# Patient Record
Sex: FEMALE | Race: OTHER | Employment: OTHER | URBAN - METROPOLITAN AREA
[De-identification: names, ages, dates, MRNs, and addresses within clinical notes are randomized per-mention and may not be internally consistent; named-entity substitution may affect disease eponyms.]

---

## 2017-04-25 ENCOUNTER — HOSPITAL ENCOUNTER (EMERGENCY)
Age: 77
Discharge: HOME | End: 2017-04-25
Attending: EMERGENCY MEDICINE
Payer: MEDICARE

## 2017-04-25 VITALS
DIASTOLIC BLOOD PRESSURE: 55 MMHG | OXYGEN SATURATION: 99 % | HEIGHT: 64 IN | HEART RATE: 65 BPM | WEIGHT: 130 LBS | SYSTOLIC BLOOD PRESSURE: 142 MMHG | BODY MASS INDEX: 22.2 KG/M2 | RESPIRATION RATE: 16 BRPM | TEMPERATURE: 98.6 F

## 2017-04-25 DIAGNOSIS — H61.23 BILATERAL IMPACTED CERUMEN: Primary | ICD-10-CM

## 2017-04-25 PROCEDURE — 99283 EMERGENCY DEPT VISIT LOW MDM: CPT | Performed by: PHYSICIAN ASSISTANT

## 2017-04-25 PROCEDURE — 74011250637 HC RX REV CODE- 250/637: Performed by: PHYSICIAN ASSISTANT

## 2017-04-25 RX ADMIN — CARBAMIDE PEROXIDE 6.5% 5 DROP: 6.5 LIQUID AURICULAR (OTIC) at 19:01

## 2017-04-25 NOTE — Clinical Note
Use drops to ears daily, warm water from shower to ears daily, may also use over the counter ear wax removal kit, see your primary md for recheck

## 2017-04-25 NOTE — ED PROVIDER NOTES
Patient is a 64 y.o. female presenting with ear pain. The history is provided by the patient. Ear Pain    This is a new problem. The current episode started more than 2 days ago. The problem occurs constantly. The problem has not changed since onset. Patient complains that both ears are affected. There has been no fever. The pain is at a severity of 4/10. The pain is mild. Associated symptoms include hearing loss. Past Medical History:   Diagnosis Date    Anxiety     Depression     Diabetes mellitus type 2, uncontrolled (HCC)     History of bilateral cataract extraction     History of coronary artery disease     History of gallbladder disease     Hypercholesterolemia     Hyperlipidemia     Hypertension     Osteopenia     Wears dentures        Past Surgical History:   Procedure Laterality Date    HX CATARACT REMOVAL Bilateral     HX CHOLECYSTECTOMY           Family History:   Problem Relation Age of Onset    No Known Problems Mother     No Known Problems Father        Social History     Social History    Marital status: SINGLE     Spouse name: N/A    Number of children: N/A    Years of education: N/A     Occupational History    Not on file. Social History Main Topics    Smoking status: Former Smoker     Packs/day: 0.25     Years: 15.00     Quit date: 6/28/1996    Smokeless tobacco: Never Used    Alcohol use Yes      Comment: social    Drug use: No    Sexual activity: Not on file     Other Topics Concern    Not on file     Social History Narrative         ALLERGIES: Review of patient's allergies indicates no known allergies. Review of Systems   HENT: Positive for ear pain and hearing loss. All other systems reviewed and are negative. Vitals:    04/25/17 1744   BP: 147/53   Pulse: 70   Resp: 16   Temp: 98.6 °F (37 °C)   SpO2: 98%   Weight: 59 kg (130 lb)   Height: 5' 4\" (1.626 m)            Physical Exam   Constitutional: She is oriented to person, place, and time.  She appears well-developed and well-nourished. No distress. HENT:   Head: Normocephalic and atraumatic. Cerumen impaction to both ears, throat clear no pain to mastoid processes     Eyes: Conjunctivae and EOM are normal. Pupils are equal, round, and reactive to light. Neck: Normal range of motion. Neck supple. Cardiovascular: Normal rate and regular rhythm. Pulmonary/Chest: Effort normal and breath sounds normal. No respiratory distress. She has no wheezes. Abdominal: Soft. Bowel sounds are normal.   Musculoskeletal: She exhibits no edema. Neurological: She is alert and oriented to person, place, and time. Skin: Skin is warm. Nursing note and vitals reviewed.        MDM  Number of Diagnoses or Management Options  Diagnosis management comments: Bilateral cerumen impactions  Stressed not to use Q Tips to ears  Debrox placed  To ears in er, use at home flushing, see pmd for recheck        Amount and/or Complexity of Data Reviewed  Review and summarize past medical records: yes    Risk of Complications, Morbidity, and/or Mortality  Presenting problems: moderate  Diagnostic procedures: low  Management options: low    Patient Progress  Patient progress: improved    ED Course       Procedures

## 2017-04-25 NOTE — ED NOTES
I have reviewed medications, follow up provider options, and discharge instructions with the patient and daughter. The patient and daughter verbalized understanding. Copy of discharge information given to daughter upon discharge. Patient discharged in no distress. Patient ambulatory to waiting area.  No questions at this time

## 2017-04-25 NOTE — DISCHARGE INSTRUCTIONS
Bloqueo de cerumen: Instrucciones de cuidado - [ Earwax Blockage: Care Instructions ]  Instrucciones de cuidado    El cerumen es zaida sustancia natural que protege el canal [place]. En general, el cerumen drena de los oídos y no causa problemas. A veces, se acumula y se endurece. El bloqueo por cerumen (también llamada impactación del cerumen) puede causar algo de pérdida de la audición y dolor. Cuando el cerumen está muy compactado necesitará que un médico lo extraiga. La atención de seguimiento es zaida parte clave de tong tratamiento y seguridad. Asegúrese de hacer y acudir a todas las citas, y llame a tong médico si está teniendo problemas. También es zaida buena idea saber los resultados de los exámenes y mantener zaida lista de los medicamentos que patti. ¿Cómo puede cuidarse en el hogar? · No intente extraer el cerumen con hisopos de algodón, dedos u otros objetos. [place] puede empeorar la obstrucción y dañar el tímpano. · Si tong médico le recomienda que trate de extraerse el cerumen en casa:  ¨ [place] y afloje el cerumen con aceite mineral tibio. También puede tratar de usar peróxido de hidrógeno mezclado con zaida cantidad igual de agua a temperatura ambiente. Ponga en el oído 2 gotas del líquido calentado a la temperatura del cuerpo, dos veces al día por un erwin de 5 días. ¨ Zaida vez que la cera está suelta y [place], por lo general todo lo que se necesita para sacarla del canal auditivo es zaida [place] y tibia. Dirija el agua hacia el oído y luego incline tong serjio para permitir que drene el cerumen. Seque el oído completamente con un secador de pelo a baja temperatura. Sostenga la secadora a varias pulgadas del oído. ¨ Si el aceite mineral tibio y la ducha no funcionan, utilice un ablandador de cera de venta jayro seguido por un lavado suave con [place] de oído todas las noches willard zaida o [place].  Asegúrese de que la solución de lavado esté a la temperatura corporal. Los líquidos fríos o calientes en el oído pueden ocasionar mareos. ¿Cuándo debe pedir ayuda? Llame a tong médico ahora mismo o busque atención médica inmediata si:  · Le sale pus o luis f del oído. · Tiene un zumbido en el oído o lo siente lleno. · Tiene pérdida de la audición. Preste especial atención a los cambios en tong tiffany y asegúrese de comunicarse con tong médico si:  · Tiene dolor o se le ha reducido la audición después de 1 semana de tratamiento casero. · Tiene algún síntoma nuevo, arley náuseas o problemas de equilibrio. ¿Dónde puede encontrar más información en inglés? Felix Doherty a http://anil-arnol.info/. Iver Kait [id number] en la búsqueda para aprender más acerca de \"Bloqueo de cerumen: Instrucciones de cuidado - [ Earwax Blockage: Care Instructions ]. \"  Revisado: 27 Toney, 2016  Versión del contenido: 11.2  © 6676-1172 Healthwise, Incorporated. Las instrucciones de cuidado fueron adaptadas bajo licencia por [employer] (which disclaims liability or warranty for this information). Si usted tiene [place] afección médica o sobre estas instrucciones, siempre pregunte a tong profesional de tiffany. cFares, "[employer]" niega toda garantía o responsabilidad por tong uso de esta información.

## 2017-04-26 ENCOUNTER — PATIENT OUTREACH (OUTPATIENT)
Dept: CASE MANAGEMENT | Age: 77
End: 2017-04-26

## 2017-04-26 NOTE — PROGRESS NOTES
This note will not be viewable in [address] [facility]REACH [facility]2  Left message to call back. 2nd outreach attempt unsuccessful. Will schedule 3rd outreach attempt within 5 business days.

## 2017-04-26 NOTE — PROGRESS NOTES
This note will not be viewable in [address] DRAKE OUTREACH #1   Left message for call back. Initial outreach attempt unsuccessful. Will attempt 2nd outreach within 24 hrs.

## 2017-05-01 ENCOUNTER — PATIENT OUTREACH (OUTPATIENT)
Dept: CASE MANAGEMENT | Age: 77
End: 2017-05-01

## 2017-07-22 ENCOUNTER — APPOINTMENT (OUTPATIENT)
Dept: GENERAL RADIOLOGY | Age: 77
End: 2017-07-22
Attending: EMERGENCY MEDICINE
Payer: MEDICARE

## 2017-07-22 ENCOUNTER — HOSPITAL ENCOUNTER (EMERGENCY)
Age: 77
Discharge: HOME | End: 2017-07-22
Attending: EMERGENCY MEDICINE
Payer: MEDICARE

## 2017-07-22 VITALS
TEMPERATURE: 98 F | RESPIRATION RATE: 16 BRPM | DIASTOLIC BLOOD PRESSURE: 66 MMHG | WEIGHT: 127 LBS | HEART RATE: 71 BPM | HEIGHT: 64 IN | BODY MASS INDEX: 21.68 KG/M2 | OXYGEN SATURATION: 96 % | SYSTOLIC BLOOD PRESSURE: 121 MMHG

## 2017-07-22 DIAGNOSIS — R07.89 CHEST WALL PAIN: Primary | ICD-10-CM

## 2017-07-22 PROCEDURE — 99283 EMERGENCY DEPT VISIT LOW MDM: CPT | Performed by: EMERGENCY MEDICINE

## 2017-07-22 PROCEDURE — 71101 X-RAY EXAM UNILAT RIBS/CHEST: CPT

## 2017-07-22 PROCEDURE — 74011250637 HC RX REV CODE- 250/637: Performed by: EMERGENCY MEDICINE

## 2017-07-22 RX ORDER — HYDROCODONE BITARTRATE AND ACETAMINOPHEN 5; 325 MG/1; MG/1
1 TABLET ORAL
Qty: 12 TAB | Refills: 0 | Status: SHIPPED | OUTPATIENT
Start: 2017-07-22 | End: 2017-10-19 | Stop reason: ALTCHOICE

## 2017-07-22 RX ORDER — HYDROCODONE BITARTRATE AND ACETAMINOPHEN 5; 325 MG/1; MG/1
1 TABLET ORAL ONCE
Status: COMPLETED | OUTPATIENT
Start: 2017-07-22 | End: 2017-07-22

## 2017-07-22 RX ADMIN — HYDROCODONE BITARTRATE AND ACETAMINOPHEN 1 TABLET: 5; 325 TABLET ORAL at 22:58

## 2017-07-23 NOTE — DISCHARGE INSTRUCTIONS
Dolor de pecho musculoesquelético: Instrucciones de cuidado - [ Musculoskeletal Chest Pain: Care Instructions ]  Instrucciones de cuidado  El dolor de pecho no siempre es zaida señal de que haya algo reji con tong corazón, o de que tenga algún otro problema grave de tiffany. Tong médico piensa que tong dolor de pecho es causado por músculos o ligamentos forzados, inflamación del cartílago del pecho, o algún otro problema en el pecho y no en el corazón. Es posible que necesite más pruebas para determinar la causa del dolor de [place]. La atención de seguimiento es zaida parte clave de tong tratamiento y seguridad. Asegúrese de hacer y acudir a todas las citas, y llame a tong médico si está teniendo problemas. También es zaida buena idea saber los resultados de los exámenes y mantener zaida lista de los medicamentos que patti. ¿Cómo puede cuidarse en el hogar? · [employer] analgésicos (medicamentos para el dolor) exactamente arley le fueron indicados. ¨ Si el médico le recetó un analgésico, tómelo según las indicaciones. ¨ Si no está tomando un analgésico recetado, pregúntele a tong médico si puede dilan amanda de [employer]. · Descanse y proteja la cecy adolorida. · Interrumpa, modifique o suspenda cualquier actividad que pudiera estar causándole el dolor. · Colóquese hielo o zaida compresa fría en la cecy adolorida willard 10 a 20 minutos cada vez. Trate de hacerlo cada 1 a 2 horas willard los siguientes 3 días (cuando esté despierto) o hasta que la hinchazón baje. Póngase un paño jerez entre el hielo y la piel. · Después de 2 ó 3 días, aplíquese zaida toalla tibia o zaida almohadilla térmica a baja temperatura en la cecy adolorida. Algunos médicos sugieren que se alterne entre tratamientos con calor y frío. · No se envuelva ni se vende con cinta las costillas para sostenerlas. [place] podría hacer que usted alli respiraciones más cortas, lo que podría aumentar tong riesgo de [place].   · [address], arley [place] o [employer], podrían aliviar los músculos adoloridos. Siga las instrucciones del envase. · Siga las instrucciones de tong médico sobre el ejercicio. · El estiramiento y el masaje suaves podrían ayudarle a mejorarse más rápidamente. Estírese despacio hasta el punto antes de que comience el dolor y mantenga el estiramiento willard al menos 15 a 30 segundos. Therese esto 3 ó 4 veces al día. Therese estiramientos después de haberse aplicado calor. · A medida que tong dolor mejore, vuelva poco a poco a mau actividades normales. Si el dolor Te[place], podría ser zaida señal de que necesita descansar willard [place]. ¿Cuándo debe pedir ayuda? Llame al 911 en cualquier momento que considere que necesita atención de emergencia. Por ejemplo, llame si:  · Siente dolor u opresión en el pecho. Estos síntomas podrían estar acompañados de:  ¨ Sudoración. ¨ Falta de aire. ¨ Náuseas o vómito. ¨ Dolor que se extiende del pecho al theo, la Christi, o hacia amanda o ambos hombros o [id number]. ¨ Mareo o aturdimiento. ¨ Pulso rápido o irregular. Después de llamar al 911, mastique 1 aspirina para adultos. Espere zaida ambulancia. No trate de conducir usted mismo un automóvil. · Tiene dolor repentino en el pecho y falta de [place], o tose luis f. Llame a tong médico ahora mismo o busque atención médica inmediata si:  · Tiene cualquier dificultad para respirar. · El dolor en el pecho empeora. · Tong dolor de pecho aparece constantemente con el ejercicio y se lalit con el reposo. Preste especial atención a los cambios en tong tiffany y asegúrese de comunicarse con tong médico si:  · Tong dolor de pecho no mejora después de 1 semana. ¿Dónde puede encontrar más información en inglés? Jluis Green a http://anil-arnol.info/. Ellen Cardona [id number] en la búsqueda para aprender más acerca de \"Dolor de pecho musculoesquelético: Instrucciones de cuidado - [ Musculoskeletal Chest Pain: Care Instructions ]. \"  Revisado: 20 marzo, 2017  Versión del contenido: 11.3  © 8943-5819 Healthwise, [employer]. Las instrucciones de cuidado fueron adaptadas bajo licencia por [employer] (which disclaims liability or warranty for this information). Si usted tiene [place] afección médica o sobre estas instrucciones, siempre pregunte a tong profesional de tiffany. [employer], [employer] niega toda garantía o responsabilidad por tong uso de esta información.

## 2017-07-23 NOTE — ED NOTES
I have reviewed medications, follow up provider options, and discharge instructions with the patient. The patient verbalized understanding. Copy of discharge information given to patient upon discharge. Prescription(s) given to patient. Patient discharged in no distress. Patient instructed not to drive while under influence of narcotic pain medication. Patient ambulatory to waiting area. No questions at this time. Family refused  as daughter of patient was present and speaks [place].

## 2017-07-23 NOTE — ED TRIAGE NOTES
Pt arrives to ED for c/o fall last Monday. States was grocery shopping and feel onto grocery cart handle with left ribs. States rib pain and trouble taking deep breathes.

## 2017-07-23 NOTE — ED PROVIDER NOTES
HPI Comments: Reports falling in shopping parking lot 6 days ago. Pain in left chest everyday since fall, unchanged. Felt should have resolved by now and wanted to have checked. Worse with deep breaths and touch in that area (left anterolateral chest wall). Family member thought skin of chest wall was swollen more than normal after fall, although improved. No bruising. The history is provided by the patient and a relative. A  was used. Past Medical History:   Diagnosis Date    Anxiety     Depression     Diabetes mellitus type 2, uncontrolled (HCC)     History of bilateral cataract extraction     History of coronary artery disease     History of gallbladder disease     Hypercholesterolemia     Hyperlipidemia     Hypertension     Osteopenia     Wears dentures        Past Surgical History:   Procedure Laterality Date    HX CATARACT REMOVAL Bilateral     HX CHOLECYSTECTOMY           Family History:   Problem Relation Age of Onset    No Known Problems Mother     No Known Problems Father        Social History     Social History    Marital status: SINGLE     Spouse name: N/A    Number of children: N/A    Years of education: N/A     Occupational History    Not on file. Social History Main Topics    Smoking status: Former Smoker     Packs/day: 0.25     Years: 15.00     Quit date: 6/28/1996    Smokeless tobacco: Never Used    Alcohol use Yes      Comment: social    Drug use: No    Sexual activity: Not on file     Other Topics Concern    Not on file     Social History Narrative         ALLERGIES: Review of patient's allergies indicates no known allergies. Review of Systems   Constitutional: Negative. HENT: Negative. Respiratory: Negative. Cardiovascular: Negative. Gastrointestinal: Negative. Endocrine: Negative. Genitourinary: Negative. Musculoskeletal: Negative. Skin: Negative. Neurological: Negative. Hematological: Negative. Vitals:    07/22/17 2116   BP: 134/65   Pulse: 72   Resp: 20   Temp: 98 °F (36.7 °C)   SpO2: 95%   Weight: 57.6 kg (127 lb)   Height: 5' 4\" (1.626 m)            Physical Exam   Constitutional: She is oriented to person, place, and time. She appears well-developed and well-nourished. Non-toxic appearance. She does not have a sickly appearance. She does not appear ill. No distress. HENT:   Head: Normocephalic and atraumatic. Cardiovascular: Intact distal pulses. Pulmonary/Chest: Effort normal.       TTP in area marked. No crepitus. No swelling appreciated. Pain radiates to back side with palpation. No midline tenderness. Not significantly changed with flex/extension of back. Abdominal: Soft. Neurological: She is alert and oriented to person, place, and time. Skin: Skin is warm and dry. Psychiatric: She has a normal mood and affect. Her behavior is normal.   Nursing note and vitals reviewed. MDM  Number of Diagnoses or Management Options  Chest wall pain: new and requires workup  Diagnosis management comments: Patient does not appear to have significant rib fracture on plain film. Lungs appeared inflated. No effusion appreciated. Patient not having a difficulty breathing. Pain is very well localized left lateral anterior side as marked and is unchanged since the day of incident. No abdominal pain appreciated. No splenomegaly, tenderness, guarding or rebound appreciated. Patient denies having any lightheadedness, nausea, vomiting or change in her symptoms since incident. She is tried nonsteroidal anti-inflammatories but has not made her pain go away completely. She understands that there can still be occult injury to the chest wall that is not visualized on plain film and would recommend NSAIDs to continue as well as warm compresses in the area of concern and have reevaluation by her primary care this week.   At no point when I expect her other new symptoms develop at this point and should only continue to improve albeit slowly. If she has any concerns going forward should return to the ER for prompt re-evaluation if not being seen by her primary care immediately. Patient understands prescription medicines for pain can be used, but likely will not resolve the pain, but could provide some relief to allow for sleep if difficult at night. I discussed the results of all labs, procedures, radiographs, and treatments with the patient and available family. Treatment plan is agreed upon and the patient is ready for discharge. Questions about treatment in the ED and differential diagnosis of presenting condition were answered. Patient was given verbal discharge instructions including, but not limited to, importance of returning to the emergency department for any concern of worsening or continued symptoms. Instructions were given to follow up with a primary care provider or specialist within 1-2 days. Adverse effects of medications, if prescribed, were discussed and patient was advised to refrain from significant physical activity until followed up by primary care physician and to not drive or operate heavy machinery after taking any sedating substances.            Amount and/or Complexity of Data Reviewed  Tests in the radiology section of CPT®: ordered and reviewed      ED Course       Procedures

## 2018-01-12 LAB — GLUCOSE BLD STRIP.AUTO-MCNC: 320 MG/DL (ref 65–100)

## 2018-01-12 PROCEDURE — 96360 HYDRATION IV INFUSION INIT: CPT | Performed by: EMERGENCY MEDICINE

## 2018-01-12 PROCEDURE — 82962 GLUCOSE BLOOD TEST: CPT

## 2018-01-12 PROCEDURE — 99283 EMERGENCY DEPT VISIT LOW MDM: CPT | Performed by: EMERGENCY MEDICINE

## 2018-01-13 ENCOUNTER — HOSPITAL ENCOUNTER (EMERGENCY)
Age: 78
Discharge: HOME | End: 2018-01-13
Attending: EMERGENCY MEDICINE
Payer: COMMERCIAL

## 2018-01-13 VITALS
DIASTOLIC BLOOD PRESSURE: 74 MMHG | BODY MASS INDEX: 21.17 KG/M2 | SYSTOLIC BLOOD PRESSURE: 126 MMHG | HEIGHT: 64 IN | HEART RATE: 73 BPM | RESPIRATION RATE: 18 BRPM | TEMPERATURE: 98 F | WEIGHT: 124 LBS | OXYGEN SATURATION: 98 %

## 2018-01-13 DIAGNOSIS — E11.9 TYPE 2 DIABETES MELLITUS WITHOUT COMPLICATION, WITH LONG-TERM CURRENT USE OF INSULIN (HCC): Primary | ICD-10-CM

## 2018-01-13 DIAGNOSIS — E78.00 HYPERCHOLESTEROLEMIA: ICD-10-CM

## 2018-01-13 DIAGNOSIS — Z79.4 TYPE 2 DIABETES MELLITUS WITHOUT COMPLICATION, WITH LONG-TERM CURRENT USE OF INSULIN (HCC): Primary | ICD-10-CM

## 2018-01-13 DIAGNOSIS — I10 ESSENTIAL HYPERTENSION: ICD-10-CM

## 2018-01-13 LAB
ALBUMIN SERPL-MCNC: 3.6 G/DL (ref 3.2–4.6)
ALBUMIN/GLOB SERPL: 0.9 {RATIO} (ref 1.2–3.5)
ALP SERPL-CCNC: 81 U/L (ref 50–136)
ALT SERPL-CCNC: 25 U/L (ref 12–65)
ANION GAP SERPL CALC-SCNC: 8 MMOL/L (ref 7–16)
AST SERPL-CCNC: 15 U/L (ref 15–37)
BASOPHILS # BLD: 0 K/UL (ref 0–0.2)
BASOPHILS NFR BLD: 1 % (ref 0–2)
BILIRUB SERPL-MCNC: 0.2 MG/DL (ref 0.2–1.1)
BUN SERPL-MCNC: 26 MG/DL (ref 8–23)
CALCIUM SERPL-MCNC: 9.1 MG/DL (ref 8.3–10.4)
CHLORIDE SERPL-SCNC: 102 MMOL/L (ref 98–107)
CO2 SERPL-SCNC: 29 MMOL/L (ref 21–32)
CREAT SERPL-MCNC: 0.64 MG/DL (ref 0.6–1)
DIFFERENTIAL METHOD BLD: NORMAL
EOSINOPHIL # BLD: 0.2 K/UL (ref 0–0.8)
EOSINOPHIL NFR BLD: 3 % (ref 0.5–7.8)
ERYTHROCYTE [DISTWIDTH] IN BLOOD BY AUTOMATED COUNT: 12.9 % (ref 11.9–14.6)
GLOBULIN SER CALC-MCNC: 4 G/DL (ref 2.3–3.5)
GLUCOSE SERPL-MCNC: 167 MG/DL (ref 65–100)
HCT VFR BLD AUTO: 39 % (ref 35.8–46.3)
HGB BLD-MCNC: 13.1 G/DL (ref 11.7–15.4)
IMM GRANULOCYTES # BLD: 0 K/UL (ref 0–0.5)
IMM GRANULOCYTES NFR BLD AUTO: 0 % (ref 0–5)
LYMPHOCYTES # BLD: 1.9 K/UL (ref 0.5–4.6)
LYMPHOCYTES NFR BLD: 36 % (ref 13–44)
MCH RBC QN AUTO: 31.5 PG (ref 26.1–32.9)
MCHC RBC AUTO-ENTMCNC: 33.6 G/DL (ref 31.4–35)
MCV RBC AUTO: 93.8 FL (ref 79.6–97.8)
MONOCYTES # BLD: 0.5 K/UL (ref 0.1–1.3)
MONOCYTES NFR BLD: 9 % (ref 4–12)
NEUTS SEG # BLD: 2.7 K/UL (ref 1.7–8.2)
NEUTS SEG NFR BLD: 51 % (ref 43–78)
PLATELET # BLD AUTO: 290 K/UL (ref 150–450)
PMV BLD AUTO: 10.9 FL (ref 10.8–14.1)
POTASSIUM SERPL-SCNC: 3.7 MMOL/L (ref 3.5–5.1)
PROT SERPL-MCNC: 7.6 G/DL (ref 6.3–8.2)
RBC # BLD AUTO: 4.16 M/UL (ref 4.05–5.25)
SODIUM SERPL-SCNC: 139 MMOL/L (ref 136–145)
WBC # BLD AUTO: 5.3 K/UL (ref 4.3–11.1)

## 2018-01-13 PROCEDURE — 96360 HYDRATION IV INFUSION INIT: CPT | Performed by: EMERGENCY MEDICINE

## 2018-01-13 PROCEDURE — 85025 COMPLETE CBC W/AUTO DIFF WBC: CPT | Performed by: EMERGENCY MEDICINE

## 2018-01-13 PROCEDURE — 74011250636 HC RX REV CODE- 250/636: Performed by: EMERGENCY MEDICINE

## 2018-01-13 PROCEDURE — 80053 COMPREHEN METABOLIC PANEL: CPT | Performed by: EMERGENCY MEDICINE

## 2018-01-13 RX ORDER — SODIUM CHLORIDE 0.9 % (FLUSH) 0.9 %
5-10 SYRINGE (ML) INJECTION EVERY 8 HOURS
Status: DISCONTINUED | OUTPATIENT
Start: 2018-01-13 | End: 2018-01-13 | Stop reason: HOSPADM

## 2018-01-13 RX ORDER — SODIUM CHLORIDE 0.9 % (FLUSH) 0.9 %
5-10 SYRINGE (ML) INJECTION AS NEEDED
Status: DISCONTINUED | OUTPATIENT
Start: 2018-01-13 | End: 2018-01-13 | Stop reason: HOSPADM

## 2018-01-13 RX ADMIN — SODIUM CHLORIDE 1000 ML: 900 INJECTION, SOLUTION INTRAVENOUS at 02:03

## 2018-01-13 NOTE — DISCHARGE INSTRUCTIONS
Aprenda acerca de las pautas alimentarias para diabetes - [ Learning About Diabetes Food Guidelines ]  Instrucciones de cuidado    La planificación de las comidas es importante para el manejo de la diabetes. Ayuda a mantener el azúcar en la luis f en el nivel ideal (que usted fija con tong médico). Usted no tiene que comer alimentos especiales. Puede comer lo mismo que tong edilma, incluso dulces de vez en cuando. [place] debe prestar atención a la cantidad y la frecuencia con que come ciertos alimentos. Rolan vez desee colaborar con un dietista o educador de diabetes certificado (CDE, por mau siglas en inglés) para que le ayuden a planificar las comidas y los refrigerios. Un dietista o CDE también puede ayudarle a bajar de peso si janelle es amanda de mau objetivos. ¿Qué debería saber acerca de ingerir carbohidratos? Manejar la cantidad de carbohidratos que ingiere es zaida parte importante de la alimentación saludable cuando tiene diabetes. Los carbohidratos se encuentran en muchos alimentos. · Sepa qué alimentos contienen carbohidratos. Y aprenda qué cantidad de carbohidratos contienen los diferentes alimentos. ¨ El pan, los cereales, la pasta y el arroz tienen aproximadamente 15 gramos de carbohidratos por porción. Zaida porción equivale a 1 rebanada de pan (1 onza o 28 g), ½ taza de cereal cocido o 1/3 de taza de pasta o arroz cocidos. ¨ Las frutas tienen 15 gramos de carbohidratos por porción. [place] porción es 1 fruta fresca pequeña, arley zaida Corpus brandon o zaida naranja; ½ banana (plátano); ½ taza de fruta cocida o enlatada; ½ taza de jugo de fruta; 1 taza de melón o frambuesas; o 2 cucharadas de frutas secas. ¨ La leche y el yogur sin azúcar agregado tienen 15 gramos de carbohidratos por porción. [place] porción es 1 taza de [place] o 2/3 taza de yogur sin azúcar agregado. ¨ Las verduras con almidón tienen 15 gramos de carbohidratos por porción.  [place] porción es ½ taza de puré de papa o camote (batata, boniato); 1 taza de calabacín; ½ papa horneada pequeña; ½ taza de frijoles cocidos; o ½ taza de maíz (elote) o arvejas (chícharos) cocidos. · Aprenda cuántos carbohidratos debe consumir cada día y en cada comida. Un dietista o CDE le puede enseñar cómo llevar la cuenta de los carbohidratos que consume. A esto se le llama recuento de carbohidratos. · Si no está seguro de cómo [place] gramos de carbohidratos, utilice el Método del [place] para planificar las comidas. Es zaida Mitesh Keira y rápida de asegurarse de que consuma comidas equilibradas. También le ayuda a distribuir los carbohidratos willard el día. ¨ Divida el plato por tipo de alimento. Llene medio plato con verduras sin almidón, ponga carne u otras proteínas en zaida cuarta parte del plato y granos o verduras con almidón en el último cuarto del plato. A esto puede agregarle un pequeño pedazo de fruta y 3 taza de [place] o yogur, según la cantidad de carbohidratos que deba consumir en zaida comida. · Trate de comer aproximadamente la misma cantidad de carbohidratos en cada comida. No \"reserve\" tong cantidad diaria de carbohidratos para consumirlos en zaida luciano comida. · Las proteínas contienen muy pocos o nada de carbohidratos por porción. Los ejemplos de proteínas incluyen carne de res, Dandre heights, [place], [place], [place], tofu, Jarrod-barre, requesón (\"cottage cheese\") y la mantequilla de cacahuate [place]). Zaida porción de carne son 3 onzas (85 g), lo cual es aproximadamente del tamaño de zaida baraja de naipes. Los ejemplos de porciones de sustitutos de la carne (equivalente a 1 onza o 28 g de carne) son 1/4 de taza de requesón, 1 huevo, 1 cucharada de [place] de cacahuate y ½ taza de tofu. ¿Cómo puede comer fuera y aún así comer de modo saludable? · Aprenda a calcular los tamaños de las porciones de alimentos que contienen carbohidratos. Si mide la comida en casa, será más fácil calcular la cantidad en zaida porción de comida de restaurante.   · Si el platillo que pide contiene demasiados carbohidratos (arley brea, maíz o frijoles al horno), pida un alimento bajo en carbohidratos en juarez lugar. Pida zaida ensalada o verduras. · Si [place] insulina, revise juarez azúcar en la luis f antes y después de comer fuera para ayudarle a planear cuánto comer en el futuro. · Si usted come más carbohidratos de lo planeado en zaida comida, dé un paseo o alli otro tipo de ejercicio. [place] ayudará a reducir el azúcar en la luis f. ¿Qué más debería saber? · Limite las grasas saturadas, arley la grasa de la carne y productos lácteos. Esta es zaida opción saludable, porque las personas que tienen diabetes tienen un mayor riesgo de enfermedades del corazón. Así que elija romero magros de carne y productos lácteos descremados o semidescremados. Utilice aceite de lawson o de canola en lugar de mantequilla o manteca al cocinar. · No se salte comidas. [place] nivel de azúcar en la luis f puede bajar demasiado si usted se salta comidas y patti insulina o ciertos medicamentos para la diabetes. · Consulte con juarez médico antes de beber alcohol. El alcohol puede hacer que juarez azúcar en la luis f baje demasiado. El alcohol también puede causar zaida reacción adversa si usted patti ciertos medicamentos para la diabetes. La atención de seguimiento es zaida parte clave de juarez tratamiento y seguridad. Asegúrese de hacer y acudir a todas las citas, y llame a juarez médico si está teniendo problemas. También es zaida buena idea saber los resultados de los exámenes y mantener zaida lista de los medicamentos que patti. ¿Dónde puede encontrar más información en inglés? Katerine Chopra a http://anil-arnol.info/. Brenda Herbert [id number] en la búsqueda para aprender más acerca de \"Aprenda acerca de las pautas alimentarias para diabetes - [ Learning About Diabetes Food Guidelines ]. \"  Revisado: 13 marzo, 2017  Versión del contenido: 11.4  © 1248-4710 Healthwise, Incorporated.  Las instrucciones de cuidado fueron adaptadas bajo licencia por [employer] (which disclaims liability or warranty for this information). Si usted tiene [place] afección médica o sobre estas instrucciones, siempre pregunte a tong profesional de tiffany. [facility] niega toda garantía o responsabilidad por tong uso de esta información.

## 2018-01-13 NOTE — ED NOTES
I have reviewed medications, follow up provider options, and discharge instructions with the daughter. The daughter verbalized understanding. Copy of discharge information given to daughter upon discharge. Patient discharged in no distress. Patient ambulatory to waiting area. No questions at this time.

## 2018-01-13 NOTE — ED PROVIDER NOTES
HPI Comments: History is obtained from the patient's daughter who speaks [place]. She states that the patient burned her left hand about a week ago and has had pain since then. She got concerned because the patient is diabetic and the area was slightly red. She is worried about a possible infection. She also states that the patient's blood sugar has been running high in the 300s for the last couple months. She does take oral medication and insulin for diabetes, both of which she has been taking. The daughter denies any fever or vomiting. Elements of this note were created using speech recognition software. As such, errors of speech recognition may be present. Patient is a 76 y.o. female presenting with skin problem. The history is provided by the patient. Skin Problem           Past Medical History:   Diagnosis Date    Anxiety     Depression     Diabetes mellitus type 2, uncontrolled (HCC)     History of bilateral cataract extraction     History of coronary artery disease     History of gallbladder disease     Hypercholesterolemia     Hyperlipidemia     Hypertension     Osteopenia     Wears dentures        Past Surgical History:   Procedure Laterality Date    HX CATARACT REMOVAL Bilateral     HX CHOLECYSTECTOMY           Family History:   Problem Relation Age of Onset    No Known Problems Mother     No Known Problems Father        Social History     Social History    Marital status: SINGLE     Spouse name: N/A    Number of children: N/A    Years of education: N/A     Occupational History    Not on file.      Social History Main Topics    Smoking status: Former Smoker     Packs/day: 0.25     Years: 15.00     Quit date: 6/28/1996    Smokeless tobacco: Never Used    Alcohol use Yes      Comment: social    Drug use: No    Sexual activity: Not on file     Other Topics Concern    Not on file     Social History Narrative         ALLERGIES: Review of patient's allergies indicates no known allergies. Review of Systems   Constitutional: Negative for chills and fever. Gastrointestinal: Negative for nausea and vomiting. All other systems reviewed and are negative. Vitals:    01/12/18 2329   BP: 127/79   Pulse: 80   Resp: 18   Temp: 97.9 °F (36.6 °C)   SpO2: 97%   Weight: 56.2 kg (124 lb)   Height: 5' 4\" (1.626 m)            Physical Exam   Constitutional: She appears well-developed and well-nourished. HENT:   Head: Normocephalic and atraumatic. Eyes: Conjunctivae are normal. Pupils are equal, round, and reactive to light. Neck: Normal range of motion. Neck supple. Cardiovascular: Normal rate and regular rhythm. Pulmonary/Chest: Effort normal and breath sounds normal.   Abdominal: Soft. Bowel sounds are normal.   Musculoskeletal: She exhibits no edema or tenderness. Hands:  Left hand healing scabs with minimal surrounding erythema at the second MCP joint laterally as indicated. No cellulitic changes noted   Neurological: She is alert. Skin: Skin is warm and dry. Nursing note and vitals reviewed. MDM  Number of Diagnoses or Management Options  Essential hypertension:   Hypercholesterolemia:   Type 2 diabetes mellitus without complication, with long-term current use of insulin [facility]):   Diagnosis management comments: differential diagnosis: saline as, DKA, medication noncompliance, electrolyte abnormality  1:52 AM fingerstick blood sugar was 320, we will get an IV and check labs  2:52 AM blood sugar has improved, no metabolic acidosis is present. Discussed this with daughter, need for close follow-up with the patient's primary doctor.        Amount and/or Complexity of Data Reviewed  Clinical lab tests: ordered and reviewed    Risk of Complications, Morbidity, and/or Mortality  Presenting problems: moderate  Diagnostic procedures: moderate  Management options: moderate    Patient Progress  Patient progress: improved    ED Course       Procedures

## 2018-01-13 NOTE — ED TRIAGE NOTES
Pt complains of pain to left hand. Daughter states she burned it on stove 1 week ago. She is concerned about continued pain and recent elevated FSBSs.

## 2018-02-06 ENCOUNTER — APPOINTMENT (OUTPATIENT)
Dept: GENERAL RADIOLOGY | Age: 78
End: 2018-02-06
Attending: EMERGENCY MEDICINE
Payer: COMMERCIAL

## 2018-02-06 ENCOUNTER — HOSPITAL ENCOUNTER (EMERGENCY)
Age: 78
Discharge: HOME | End: 2018-02-06
Attending: EMERGENCY MEDICINE
Payer: COMMERCIAL

## 2018-02-06 VITALS
DIASTOLIC BLOOD PRESSURE: 74 MMHG | RESPIRATION RATE: 18 BRPM | WEIGHT: 132 LBS | BODY MASS INDEX: 22.53 KG/M2 | OXYGEN SATURATION: 98 % | TEMPERATURE: 97.6 F | HEIGHT: 64 IN | HEART RATE: 64 BPM | SYSTOLIC BLOOD PRESSURE: 172 MMHG

## 2018-02-06 DIAGNOSIS — S63.502A WRIST SPRAIN, LEFT, INITIAL ENCOUNTER: Primary | ICD-10-CM

## 2018-02-06 PROCEDURE — L3908 WHO COCK-UP NONMOLDE PRE OTS: HCPCS

## 2018-02-06 PROCEDURE — 74011250637 HC RX REV CODE- 250/637: Performed by: EMERGENCY MEDICINE

## 2018-02-06 PROCEDURE — 73090 X-RAY EXAM OF FOREARM: CPT

## 2018-02-06 PROCEDURE — 99283 EMERGENCY DEPT VISIT LOW MDM: CPT | Performed by: EMERGENCY MEDICINE

## 2018-02-06 PROCEDURE — 75810000053 HC SPLINT APPLICATION: Performed by: EMERGENCY MEDICINE

## 2018-02-06 PROCEDURE — 73130 X-RAY EXAM OF HAND: CPT

## 2018-02-06 RX ORDER — IBUPROFEN 600 MG/1
600 TABLET ORAL
Qty: 20 TAB | Refills: 1 | OUTPATIENT
Start: 2018-02-06 | End: 2020-03-10

## 2018-02-06 RX ORDER — HYDROCODONE BITARTRATE AND ACETAMINOPHEN 5; 325 MG/1; MG/1
1 TABLET ORAL
Status: COMPLETED | OUTPATIENT
Start: 2018-02-06 | End: 2018-02-06

## 2018-02-06 RX ADMIN — HYDROCODONE BITARTRATE AND ACETAMINOPHEN 1 TABLET: 5; 325 TABLET ORAL at 19:08

## 2018-02-06 NOTE — DISCHARGE INSTRUCTIONS
Esguince de ernesto: Instrucciones de cuidado - [ Wrist Sprain: Care Instructions ]  Instrucciones de cuidado    La ernesto le duele porque se toledo estirado o desgarrado los ligamentos que conectan los huesos de la [place]. Los esguinces (torceduras) de ernesto generalmente tardan de 2 a 10 semanas en sanar, shaka en algunos casos se necesita más tiempo. Por lo general, mientras más le duele, más grave es el esguince de [place] y [place] tardará en sanar. Usted puede sanar más rápido y recuperar la fuerza de la ernesto con un buen tratamiento en el hogar. La atención de seguimiento es zaida parte clave de tong tratamiento y seguridad. Asegúrese de hacer y acudir a todas las citas, y llame a tong médico si está teniendo problemas. También es zaida buena idea saber los resultados de los exámenes y mantener zaida lista de los medicamentos que patti. ¿Cómo puede cuidarse en el hogar? · Eleve el brazo sobre zaida almohada cuando se aplica hielo o en cualquier momento que se siente o acueste willard los 3 días siguientes. Trate de mantener tong ernesto por encima del nivel del corazón. Horatio ayudará a reducir la hinchazón. · Colóquese hielo o zaida compresa fría en la ernesto willard 10 a 20 minutos cada vez. Trate de hacerlo cada 1 a 2 horas willard los siguientes 3 días (cuando esté despierto) o hasta que la hinchazón baje. Póngase un paño jerez entre el hielo y la piel. · Después de 2 ó 3 días, si la hinchazón ha desaparecido, póngase en la ernesto zaida almohadilla térmica ajustada a baja temperatura o un paño tibio. Horatio le ayudará a mantener flexible la ernesto. Algunos médicos sugieren que se alterne entre tratamientos con calor y frío. · Si tiene un vendaje elástico, llévelo puesto willard las siguientes 24 a 36 horas. El vendaje debe estar ajustado shaka no cassidy apretado que cause entumecimiento u hormigueo. Para volver a envolver la ernesto, enrolle el vendaje en la mano varias veces, empezando por los dedos.  Genoveva Ayala alrededor de la mano entre el [facility] y [place], y termine envolviendo la ernesto varias veces. · Si juarez médico le rah zaida tablilla (férula) o aparato ortopédico, úselo según las indicaciones para proteger juarez ernesto hasta que haya sanado. · [employer] analgésicos (medicamentos para el dolor) exactamente según las indicaciones. ¨ Si el médico le recetó un analgésico, tómelo según las indicaciones. ¨ Si no está tomando un analgésico recetado, pregúntele a juarez médico si puede dilan amanda de [employer]. · Trate de no usar la [employer] y la mano lesionadas. ¿Cuándo debe pedir ayuda? Llame a juarez médico ahora mismo o busque atención médica inmediata si:  ? · Juarez mano o mau dedos están fríos o pálidos o [place] de color. ?Preste especial atención a los cambios en juarez tiffany y asegúrese de comunicarse con juarez médico si:  ? · El dolor [facility]. ? · Juarez ernesto no ha monisha después de 1 semana. ¿Dónde puede encontrar más información en inglés? Vero Quiver a http://anil-arnol.info/. Sabino Sit U438 en la búsqueda para aprender más acerca de \"Esguince de ernesto: Instrucciones de cuidado - [ Wrist Sprain: Care Instructions ]. \"  Revisado: 21 marzo, 2017  Versión del contenido: 11.4  © 9576-2959 Healthwise, Incorporated. Las instrucciones de cuidado fueron adaptadas bajo licencia por [employer] (which disclaims liability or warranty for this information). Si usted tiene [employer] afección médica o sobre estas instrucciones, siempre pregunte a juarez profesional de tiffany. Healthwise, Incorporated niega toda garantía o responsabilidad por juarez uso de esta información.

## 2018-02-06 NOTE — Clinical Note
Elevation and cool packs to area of injury Wear splint If pain persists at present level will need further workup and repeat x-ray of the area of injury in 5-7 days

## 2018-02-06 NOTE — ED TRIAGE NOTES
Pt was going up stairs and fell backwards landing on the left arm. Pt states pain from the elbow down. Pt his her head during the fall.

## 2018-02-06 NOTE — ED PROVIDER NOTES
HPI Comments: Out walking a dog when she had a trip and fall and breaking her fall with an outreach of her left arm. She has pain to the dorsal aspect of the left wrist possibly slight swelling. No distal numbness or weakness. Possibly bumped her head but no loss of consciousness. denies any present head pain or area of localized tenderness or swelling. Denies any neck pain. Denies any back pain. No rib or chest pain. The right wrist and right arm are unaffected    Patient is a 76 y.o. female presenting with fall. The history is provided by the patient and a relative. Fall   The accident occurred 3 to 5 hours ago. The fall occurred while walking. She fell from a height of ground level. Impact surface: hard surface. There was no blood loss. The point of impact was the left wrist. The pain is moderate. She was ambulatory at the scene. There was no drug use involved in the accident. There was no alcohol use involved in the accident. Pertinent negatives include no visual change, no numbness, no nausea, no vomiting, no headaches, no extremity weakness, no loss of consciousness, no tingling and no laceration. The risk factors include being elderly. The symptoms are aggravated by pressure on injury and use of injured limb. She has tried nothing for the symptoms.         Past Medical History:   Diagnosis Date    Anxiety     Depression     Diabetes mellitus type 2, uncontrolled (HCC)     History of bilateral cataract extraction     History of coronary artery disease     History of gallbladder disease     Hypercholesterolemia     Hyperlipidemia     Hypertension     Osteopenia     Wears dentures        Past Surgical History:   Procedure Laterality Date    HX CATARACT REMOVAL Bilateral     HX CHOLECYSTECTOMY           Family History:   Problem Relation Age of Onset    No Known Problems Mother     No Known Problems Father        Social History     Social History    Marital status: SINGLE     Spouse name: N/A    Number of children: N/A    Years of education: N/A     Occupational History    Not on file. Social History Main Topics    Smoking status: Former Smoker     Packs/day: 0.25     Years: 15.00     Quit date: 6/28/1996    Smokeless tobacco: Never Used    Alcohol use Yes      Comment: social    Drug use: No    Sexual activity: Not on file     Other Topics Concern    Not on file     Social History Narrative         ALLERGIES: Review of patient's allergies indicates no known allergies. Review of Systems   HENT: Negative. Respiratory: Negative. Cardiovascular: Negative. Gastrointestinal: Negative for nausea and vomiting. Musculoskeletal: Negative for back pain, extremity weakness, joint swelling, neck pain and neck stiffness. Skin: Negative. Negative for color change and wound. Neurological: Negative for tingling, loss of consciousness, numbness and headaches. All other systems reviewed and are negative. Vitals:    02/06/18 1607   BP: 173/71   Pulse: 60   Resp: 20   Temp: 97.5 °F (36.4 °C)   SpO2: 97%   Weight: 59.9 kg (132 lb)   Height: 5' 4\" (1.626 m)            Physical Exam   Constitutional: She appears well-developed and well-nourished. No distress. HENT:   Head: Atraumatic. Eyes: No scleral icterus. Neck: Neck supple. Cardiovascular: Normal rate. Pulmonary/Chest: Effort normal. No respiratory distress. Abdominal: Soft. Musculoskeletal: She exhibits tenderness. Right shoulder: Normal.        Right elbow: Normal.       Left elbow: Normal.        Right wrist: Normal.        Left wrist: She exhibits tenderness and swelling. She exhibits no crepitus, no deformity and no laceration.         Right hip: Normal.        Left hip: Normal.        Right knee: Normal.        Left knee: Normal.        Right ankle: Normal.        Left ankle: Normal.        Cervical back: Normal.        Thoracic back: Normal.        Lumbar back: Normal.        Left forearm: She exhibits no swelling, no edema and no deformity. Neurological: She is alert. She exhibits normal muscle tone. Coordination normal.   Skin: Skin is warm and dry. No laceration noted. Psychiatric: Her behavior is normal. Thought content normal.   Nursing note and vitals reviewed. MDM  Number of Diagnoses or Management Options  Wrist sprain, left, initial encounter:   Diagnosis management comments: Soreness to the dorsal aspect left wrist with normal radiographic findings. No other area of significant injury to exam.  Radiograph shows no displaced fracture but review of this information with patient and daughter and awareness of importance to recheck/re-x-ray with any persistence of pain at present level. Possibility of occult fracture is discussed in this context.        Amount and/or Complexity of Data Reviewed  Tests in the radiology section of CPT®: reviewed and ordered    Risk of Complications, Morbidity, and/or Mortality  Presenting problems: moderate  Diagnostic procedures: low  Management options: moderate    Patient Progress  Patient progress: stable        ED Course       Procedures

## 2018-02-07 NOTE — ED NOTES
I have reviewed discharge instructions with the patient. The patient verbalized understanding. Patient left ED via Discharge Method: ambulatory to Home with family. Opportunity for questions and clarification provided. Patient given one e-script scripts. To continue your aftercare when you leave the hospital, you may receive an automated call from our care team to check in on how you are doing. This is a free service and part of our promise to provide the best care and service to meet your aftercare needs.  If you have questions, or wish to unsubscribe from this service please call [phone]. Thank you for Choosing our [address] [place] [address] Emergency Department.

## 2018-03-24 ENCOUNTER — APPOINTMENT (OUTPATIENT)
Dept: GENERAL RADIOLOGY | Age: 78
End: 2018-03-24
Attending: EMERGENCY MEDICINE
Payer: COMMERCIAL

## 2018-03-24 ENCOUNTER — HOSPITAL ENCOUNTER (EMERGENCY)
Age: 78
Discharge: HOME | End: 2018-03-24
Attending: EMERGENCY MEDICINE
Payer: COMMERCIAL

## 2018-03-24 VITALS
OXYGEN SATURATION: 97 % | TEMPERATURE: 98.4 F | BODY MASS INDEX: 23.32 KG/M2 | HEIGHT: 65 IN | HEART RATE: 81 BPM | DIASTOLIC BLOOD PRESSURE: 76 MMHG | SYSTOLIC BLOOD PRESSURE: 153 MMHG | RESPIRATION RATE: 16 BRPM | WEIGHT: 140 LBS

## 2018-03-24 DIAGNOSIS — T07.XXXA MULTIPLE CONTUSIONS: Primary | ICD-10-CM

## 2018-03-24 PROCEDURE — 99283 EMERGENCY DEPT VISIT LOW MDM: CPT | Performed by: EMERGENCY MEDICINE

## 2018-03-24 PROCEDURE — 73130 X-RAY EXAM OF HAND: CPT

## 2018-03-24 PROCEDURE — 74011250637 HC RX REV CODE- 250/637: Performed by: EMERGENCY MEDICINE

## 2018-03-24 PROCEDURE — 71046 X-RAY EXAM CHEST 2 VIEWS: CPT

## 2018-03-24 RX ORDER — TRAMADOL HYDROCHLORIDE 50 MG/1
50-100 TABLET ORAL
Qty: 20 TAB | Refills: 0 | Status: SHIPPED | OUTPATIENT
Start: 2018-03-24 | End: 2018-05-30 | Stop reason: ALTCHOICE

## 2018-03-24 RX ORDER — NAPROXEN SODIUM 550 MG/1
550 TABLET ORAL 2 TIMES DAILY WITH MEALS
Qty: 20 TAB | Refills: 0 | Status: SHIPPED | OUTPATIENT
Start: 2018-03-24 | End: 2018-05-30 | Stop reason: ALTCHOICE

## 2018-03-24 RX ORDER — TRAMADOL HYDROCHLORIDE 50 MG/1
100 TABLET ORAL
Status: COMPLETED | OUTPATIENT
Start: 2018-03-24 | End: 2018-03-24

## 2018-03-24 RX ORDER — IBUPROFEN 600 MG/1
600 TABLET ORAL
Status: COMPLETED | OUTPATIENT
Start: 2018-03-24 | End: 2018-03-24

## 2018-03-24 RX ADMIN — TRAMADOL HYDROCHLORIDE 100 MG: 50 TABLET, FILM COATED ORAL at 21:53

## 2018-03-24 RX ADMIN — IBUPROFEN 600 MG: 600 TABLET, FILM COATED ORAL at 21:53

## 2018-03-24 NOTE — ED TRIAGE NOTES
Family declined use of .  Family states that she fell coming out of the supper market pt state that she tripped and she is hurting all over knees, face, back  and while body hurts

## 2018-03-25 NOTE — DISCHARGE INSTRUCTIONS
Moretones: Instrucciones de cuidado - [ Bruises: Care Instructions ]  Instrucciones de cuidado    Los moretones ocurren cuando los pequeños vasos sanguíneos que se encuentran debajo de la piel se rompen o se desgarran, en la mayoría de los casos por torceduras, golpes o caídas. La luis f se [employer] tejidos que están debajo de la piel y crea zaida vivek negruzca y [place] que frecuentemente cambia de color, entre ellos lynda violáceo, louis rojizo o robert amarillento a medida que el moretón va desapareciendo. Los moretones [employer] no son graves y desaparecen sin que se alli nada dentro de las 2 a 4 semanas siguientes. A veces, la gravedad hace que se dispersen por el cuerpo. Por lo general, un moretón en zaida pierna tardará [employer] en sanar que amanda en la lion o los brazos. La atención de seguimiento es zaida parte clave de tong tratamiento y seguridad. Asegúrese de hacer y acudir a todas las citas, y llame a tong médico si está teniendo problemas. También es zaida buena idea saber los resultados de los exámenes y mantener zaida lista de los medicamentos que patti. ¿Cómo puede cuidarse en el hogar? · [employer] analgésicos (medicamentos para el dolor) exactamente arley le fueron indicados. ¨ Si el médico le recetó analgésicos, tómelos según las indicaciones. ¨ Si no está tomando un analgésico recetado, pregúntele a tong médico si puede dilan amanda de [employer]. · Colóquese hielo o zaida compresa fría sobre la cecy willard 10 a 20 minutos cada vez. Póngase un paño jerez entre el hielo y la piel. · De ser posible, eleve la cecy amoratada sobre almohadas tanto arley pueda willard los siguientes días. Trate de mantener el moretón por encima del nivel del corazón. ¿Cuándo debes pedir ayuda? Llama a tu médico ahora mismo o busca atención médica inmediata si:  ? · Tienes señales de infección, tales arley:  ¨ Aumento del dolor, la hinchazón, el enrojecimiento o la temperatura.   ¨ Vetas rojizas que salen del vika. ¨ Pus que supura del vika. Hyla Hilton. ? · Tienes un moretón en la pierna y señales de un coágulo de [place], tales arley:  ¨ Más enrojecimiento e hinchazón, con aumento de la temperatura y la sensibilidad, y dolor en la cecy del vika. ¨ Dolor en la pantorrilla, detrás de la rodilla, en el muslo o en la suzy. ¨ Enrojecimiento e hinchazón en la pierna o la suzy. ? · Tu dolor [facility]. ?Presta especial atención a los cambios en tu tiffany y asegúrate de comunicarte con tu médico si:  ? · No mejoras arley se esperaba. ¿Dónde puede encontrar más información en inglés? Larisa Proper a http://anil-arnol.info/. [place] [address] en la búsqueda para aprender más acerca de \"Moretones: Instrucciones de cuidado - [ Bruises: Care Instructions ]. \"  Revisado: 20 Stephani Rae 2017  Versión del contenido: 11.4  © 6395-6436 Healthwise, Incorporated. Las instrucciones de cuidado fueron adaptadas bajo licencia por [employer] (which disclaims liability or warranty for this information). Si usted tiene McCormick Carter afección médica o sobre estas instrucciones, siempre pregunte a tong profesional de tiffany. Healthwise, Incorporated niega toda garantía o responsabilidad por tong uso de esta información.

## 2018-03-25 NOTE — ED PROVIDER NOTES
Patient is a 76 y.o. female presenting with fall. The history is provided by the patient. Fall   Incident onset: Between 8:30 AM and 9 AM today. She fell from a height of ground level. She landed on hard floor. There was no blood loss. The point of impact was the head, right knee and right wrist. The pain is present in the head, right knee and right wrist. The pain is mild. She was ambulatory at the scene. Pertinent negatives include no numbness, no abdominal pain, no nausea, no vomiting, no hematuria, no headaches, no loss of consciousness and no tingling. The symptoms are aggravated by activity. She has tried acetaminophen for the symptoms. The treatment provided mild relief. Past Medical History:   Diagnosis Date    Anxiety     Depression     Diabetes mellitus type 2, uncontrolled (HCC)     History of bilateral cataract extraction     History of coronary artery disease     History of gallbladder disease     Hypercholesterolemia     Hyperlipidemia     Hypertension     Osteopenia     Wears dentures        Past Surgical History:   Procedure Laterality Date    HX CATARACT REMOVAL Bilateral     HX CHOLECYSTECTOMY           Family History:   Problem Relation Age of Onset    No Known Problems Mother     No Known Problems Father        Social History     Social History    Marital status: SINGLE     Spouse name: N/A    Number of children: N/A    Years of education: N/A     Occupational History    Not on file. Social History Main Topics    Smoking status: Former Smoker     Packs/day: 0.25     Years: 15.00     Quit date: 6/28/1996    Smokeless tobacco: Never Used    Alcohol use Yes      Comment: social    Drug use: No    Sexual activity: Not on file     Other Topics Concern    Not on file     Social History Narrative         ALLERGIES: Review of patient's allergies indicates no known allergies. Review of Systems   HENT: Positive for facial swelling.  Negative for dental problem and nosebleeds. Eyes: Negative for pain and redness. Respiratory: Negative for shortness of breath and stridor. Cardiovascular: Positive for chest pain. Gastrointestinal: Negative for abdominal pain, nausea and vomiting. Genitourinary: Negative for difficulty urinating and hematuria. Musculoskeletal: Positive for arthralgias. Negative for back pain, gait problem, joint swelling, myalgias, neck pain and neck stiffness. Skin: Negative for pallor and wound. Neurological: Negative for dizziness, tingling, loss of consciousness, syncope, weakness, numbness and headaches. Vitals:    03/24/18 1917   BP: 153/76   Pulse: 81   Resp: 16   Temp: 98.4 °F (36.9 °C)   SpO2: 97%   Weight: 63.5 kg (140 lb)   Height: 5' 5\" (1.651 m)            Physical Exam   Constitutional: She is oriented to person, place, and time. She appears well-developed and well-nourished. No distress. HENT:   Head: Normocephalic. Eyes: Conjunctivae and EOM are normal. Pupils are equal, round, and reactive to light. Right eye exhibits no discharge. Left eye exhibits no discharge. No scleral icterus. Neck: Normal range of motion and full passive range of motion without pain. Neck supple. No spinous process tenderness and no muscular tenderness present. Normal range of motion present. Cardiovascular: Normal rate, regular rhythm and normal heart sounds. Exam reveals no gallop. No murmur heard. Pulmonary/Chest: Effort normal and breath sounds normal. No respiratory distress. She has no wheezes. She has no rales. Abdominal: Soft. Bowel sounds are normal. There is no tenderness. There is no guarding. Musculoskeletal: Normal range of motion. She exhibits no edema. Hands:       Legs:  Neurological: She is alert and oriented to person, place, and time. She exhibits normal muscle tone. cni 2-12 grossly   Skin: Skin is warm and dry. She is not diaphoretic. Psychiatric: She has a normal mood and affect.  Her behavior is normal.   Nursing note and vitals reviewed. MDM  Number of Diagnoses or Management Options  Multiple contusions:   Diagnosis management comments: Medical decision making note:  Multiple contusions status post fall  No loss of consciousness  Everything seems to hurt a little bit, right hand, right chest, right knee the most  knee x-ray not indicated  Continue with Tylenol, Add ulTRAM as needed  This concludes the \"medical decision making note\" part of this emergency department visit note.           ED Course       Procedures

## 2018-03-25 NOTE — ED NOTES
I have reviewed discharge instructions with the patient and daughter. The daughter verbalized understanding. Patient left ED via Discharge Method: ambulatory to Home with daughter. Opportunity for questions and clarification provided. Patient given 2 scripts. To continue your aftercare when you leave the hospital, you may receive an automated call from our care team to check in on how you are doing. This is a free service and part of our promise to provide the best care and service to meet your aftercare needs.  If you have questions, or wish to unsubscribe from this service please call [phone]. Thank you for Choosing our [facility] Emergency Department.

## 2018-06-05 ENCOUNTER — HOSPITAL ENCOUNTER (EMERGENCY)
Age: 78
Discharge: HOME | End: 2018-06-05
Attending: EMERGENCY MEDICINE
Payer: COMMERCIAL

## 2018-06-05 ENCOUNTER — APPOINTMENT (OUTPATIENT)
Dept: CT IMAGING | Age: 78
End: 2018-06-05
Attending: EMERGENCY MEDICINE
Payer: COMMERCIAL

## 2018-06-05 ENCOUNTER — APPOINTMENT (OUTPATIENT)
Dept: GENERAL RADIOLOGY | Age: 78
End: 2018-06-05
Attending: STUDENT IN AN ORGANIZED HEALTH CARE EDUCATION/TRAINING PROGRAM
Payer: COMMERCIAL

## 2018-06-05 VITALS
SYSTOLIC BLOOD PRESSURE: 168 MMHG | OXYGEN SATURATION: 97 % | TEMPERATURE: 97.8 F | BODY MASS INDEX: 21.34 KG/M2 | HEIGHT: 64 IN | HEART RATE: 76 BPM | RESPIRATION RATE: 18 BRPM | DIASTOLIC BLOOD PRESSURE: 77 MMHG | WEIGHT: 125 LBS

## 2018-06-05 DIAGNOSIS — R07.89 ATYPICAL CHEST PAIN: Primary | ICD-10-CM

## 2018-06-05 DIAGNOSIS — R51.9 NONINTRACTABLE HEADACHE, UNSPECIFIED CHRONICITY PATTERN, UNSPECIFIED HEADACHE TYPE: ICD-10-CM

## 2018-06-05 DIAGNOSIS — K21.9 GASTROESOPHAGEAL REFLUX DISEASE, ESOPHAGITIS PRESENCE NOT SPECIFIED: ICD-10-CM

## 2018-06-05 LAB
ALBUMIN SERPL-MCNC: 3.4 G/DL (ref 3.2–4.6)
ALBUMIN/GLOB SERPL: 0.8 {RATIO} (ref 1.2–3.5)
ALP SERPL-CCNC: 117 U/L (ref 50–136)
ALT SERPL-CCNC: 20 U/L (ref 12–65)
ANION GAP SERPL CALC-SCNC: 10 MMOL/L (ref 7–16)
AST SERPL-CCNC: 15 U/L (ref 15–37)
ATRIAL RATE: 75 BPM
BASOPHILS # BLD: 0 K/UL (ref 0–0.2)
BASOPHILS NFR BLD: 1 % (ref 0–2)
BILIRUB SERPL-MCNC: 0.3 MG/DL (ref 0.2–1.1)
BUN SERPL-MCNC: 9 MG/DL (ref 8–23)
CALCIUM SERPL-MCNC: 9 MG/DL (ref 8.3–10.4)
CALCULATED P AXIS, ECG09: 29 DEGREES
CALCULATED R AXIS, ECG10: 33 DEGREES
CALCULATED T AXIS, ECG11: 38 DEGREES
CHLORIDE SERPL-SCNC: 106 MMOL/L (ref 98–107)
CO2 SERPL-SCNC: 27 MMOL/L (ref 21–32)
CREAT SERPL-MCNC: 0.53 MG/DL (ref 0.6–1)
DIAGNOSIS, 93000: NORMAL
DIFFERENTIAL METHOD BLD: ABNORMAL
EOSINOPHIL # BLD: 0.2 K/UL (ref 0–0.8)
EOSINOPHIL NFR BLD: 3 % (ref 0.5–7.8)
ERYTHROCYTE [DISTWIDTH] IN BLOOD BY AUTOMATED COUNT: 12.4 % (ref 11.9–14.6)
GLOBULIN SER CALC-MCNC: 4.2 G/DL (ref 2.3–3.5)
GLUCOSE SERPL-MCNC: 132 MG/DL (ref 65–100)
HCT VFR BLD AUTO: 37.8 % (ref 35.8–46.3)
HGB BLD-MCNC: 12.9 G/DL (ref 11.7–15.4)
IMM GRANULOCYTES # BLD: 0 K/UL (ref 0–0.5)
IMM GRANULOCYTES NFR BLD AUTO: 0 % (ref 0–5)
LIPASE SERPL-CCNC: 209 U/L (ref 73–393)
LYMPHOCYTES # BLD: 1.8 K/UL (ref 0.5–4.6)
LYMPHOCYTES NFR BLD: 33 % (ref 13–44)
MCH RBC QN AUTO: 31.1 PG (ref 26.1–32.9)
MCHC RBC AUTO-ENTMCNC: 34.1 G/DL (ref 31.4–35)
MCV RBC AUTO: 91.1 FL (ref 79.6–97.8)
MONOCYTES # BLD: 0.1 K/UL (ref 0.1–1.3)
MONOCYTES NFR BLD: 1 % (ref 4–12)
NEUTS SEG # BLD: 3.3 K/UL (ref 1.7–8.2)
NEUTS SEG NFR BLD: 62 % (ref 43–78)
P-R INTERVAL, ECG05: 146 MS
PLATELET # BLD AUTO: 307 K/UL (ref 150–450)
PMV BLD AUTO: 11.3 FL (ref 10.8–14.1)
POTASSIUM SERPL-SCNC: 3.9 MMOL/L (ref 3.5–5.1)
PROT SERPL-MCNC: 7.6 G/DL (ref 6.3–8.2)
Q-T INTERVAL, ECG07: 396 MS
QRS DURATION, ECG06: 90 MS
QTC CALCULATION (BEZET), ECG08: 442 MS
RBC # BLD AUTO: 4.15 M/UL (ref 4.05–5.25)
SODIUM SERPL-SCNC: 143 MMOL/L (ref 136–145)
TROPONIN I BLD-MCNC: 0 NG/ML (ref 0.02–0.05)
TROPONIN I SERPL-MCNC: <0.02 NG/ML (ref 0.02–0.05)
VENTRICULAR RATE, ECG03: 75 BPM
WBC # BLD AUTO: 5.4 K/UL (ref 4.3–11.1)

## 2018-06-05 PROCEDURE — 83690 ASSAY OF LIPASE: CPT | Performed by: STUDENT IN AN ORGANIZED HEALTH CARE EDUCATION/TRAINING PROGRAM

## 2018-06-05 PROCEDURE — 93005 ELECTROCARDIOGRAM TRACING: CPT | Performed by: STUDENT IN AN ORGANIZED HEALTH CARE EDUCATION/TRAINING PROGRAM

## 2018-06-05 PROCEDURE — 99285 EMERGENCY DEPT VISIT HI MDM: CPT | Performed by: EMERGENCY MEDICINE

## 2018-06-05 PROCEDURE — 84484 ASSAY OF TROPONIN QUANT: CPT | Performed by: STUDENT IN AN ORGANIZED HEALTH CARE EDUCATION/TRAINING PROGRAM

## 2018-06-05 PROCEDURE — 71046 X-RAY EXAM CHEST 2 VIEWS: CPT

## 2018-06-05 PROCEDURE — 74011250637 HC RX REV CODE- 250/637: Performed by: EMERGENCY MEDICINE

## 2018-06-05 PROCEDURE — 80053 COMPREHEN METABOLIC PANEL: CPT | Performed by: STUDENT IN AN ORGANIZED HEALTH CARE EDUCATION/TRAINING PROGRAM

## 2018-06-05 PROCEDURE — 74011250636 HC RX REV CODE- 250/636: Performed by: EMERGENCY MEDICINE

## 2018-06-05 PROCEDURE — 85025 COMPLETE CBC W/AUTO DIFF WBC: CPT | Performed by: STUDENT IN AN ORGANIZED HEALTH CARE EDUCATION/TRAINING PROGRAM

## 2018-06-05 PROCEDURE — 81003 URINALYSIS AUTO W/O SCOPE: CPT | Performed by: EMERGENCY MEDICINE

## 2018-06-05 PROCEDURE — 96374 THER/PROPH/DIAG INJ IV PUSH: CPT | Performed by: EMERGENCY MEDICINE

## 2018-06-05 PROCEDURE — 70450 CT HEAD/BRAIN W/O DYE: CPT

## 2018-06-05 RX ORDER — GUAIFENESIN 100 MG/5ML
324 LIQUID (ML) ORAL
Status: COMPLETED | OUTPATIENT
Start: 2018-06-05 | End: 2018-06-05

## 2018-06-05 RX ORDER — ONDANSETRON 2 MG/ML
4 INJECTION INTRAMUSCULAR; INTRAVENOUS
Status: COMPLETED | OUTPATIENT
Start: 2018-06-05 | End: 2018-06-05

## 2018-06-05 RX ORDER — FAMOTIDINE 20 MG/1
20 TABLET, FILM COATED ORAL 2 TIMES DAILY
Qty: 20 TAB | Refills: 0 | Status: SHIPPED | OUTPATIENT
Start: 2018-06-05 | End: 2018-06-15

## 2018-06-05 RX ADMIN — ONDANSETRON 4 MG: 2 INJECTION INTRAMUSCULAR; INTRAVENOUS at 07:40

## 2018-06-05 RX ADMIN — Medication 30 ML: at 07:40

## 2018-06-05 RX ADMIN — ASPIRIN 81 MG 324 MG: 81 TABLET ORAL at 09:27

## 2018-06-05 NOTE — ED NOTES
I have reviewed discharge instructions with the patient. The patient verbalized understanding. Patient left ED via Discharge Method: ambulatory to Home with daughter    Opportunity for questions and clarification provided. Patient given 0 scripts. To continue your aftercare when you leave the hospital, you may receive an automated call from our care team to check in on how you are doing. This is a free service and part of our promise to provide the best care and service to meet your aftercare needs.  If you have questions, or wish to unsubscribe from this service please call [phone]. Thank you for Choosing our [employer] Emergency Department.

## 2018-06-05 NOTE — LETTER
[address] [facility] EMERGENCY DEPT One [address] [address] [phone] Work/School Note Date: 6/5/2018 To Whom It May concern: 
 
Jose Ardon was seen and treated today in the emergency room by the following provider(s): 
Attending Provider: Del James MD. Please excuse her daughter from work today, 6/5/2018 Sincerely, 
 
 
 
 
Jennifer Espinal RN

## 2018-06-05 NOTE — ED PROVIDER NOTES
HPI Comments: 77 yo F w/ PMHX of HTN, DM2, HLD presents with complaint of substernal chest pain that has worsened since last evening. Describes pain as a burning sensation. Denies radiation of pain. States pain is constant. Denies nausea, vomiting, fever, chills, cough, diaphoresis, dizziness, weakness, shortness of breath, hemoptysis, LE swelling or pain, diarrhea, constipation. Patient states that she fell 2-3 weeks ago and has had a mild frontal headache since. Patient denies numbness, tingling, weakness, slurred speech, facial droop. Patient denies history of CAD. States she's never been seen by cardiologist in the past.  Patient denies any alleviating or exacerbating factors. Patient is a 76 y.o. female presenting with chest pain. The history is provided by the patient. The history is limited by a language barrier. A  was used (Offered ; states she would like to use daughter for interpretation purposes). Chest Pain (Angina)    This is a new problem. The current episode started yesterday. The problem has not changed since onset. The problem occurs constantly. The pain is associated with rest. The pain is present in the substernal region. The pain is at a severity of 2/10. The pain is mild. The quality of the pain is described as sharp. The pain does not radiate. Associated symptoms include headaches. Pertinent negatives include no abdominal pain, no back pain, no claudication, no cough, no diaphoresis, no dizziness, no exertional chest pressure, no fever, no hemoptysis, no leg pain, no lower extremity edema, no malaise/fatigue, no nausea, no near-syncope, no numbness, no orthopnea, no palpitations, no PND, no shortness of breath, no sputum production, no vomiting and no weakness. She has tried nothing for the symptoms. The treatment provided no relief. Risk factors include hypertension and diabetes mellitus.  Her past medical history is significant for DM and HTN.       Past Medical History:   Diagnosis Date    Anxiety     Depression     Diabetes mellitus type 2, uncontrolled (HCC)     History of bilateral cataract extraction     History of coronary artery disease     History of gallbladder disease     Hypercholesterolemia     Hyperlipidemia     Hypertension     Osteopenia     Wears dentures        Past Surgical History:   Procedure Laterality Date    HX CATARACT REMOVAL Bilateral     HX CHOLECYSTECTOMY           Family History:   Problem Relation Age of Onset    No Known Problems Mother     No Known Problems Father        Social History     Social History    Marital status: SINGLE     Spouse name: N/A    Number of children: N/A    Years of education: N/A     Occupational History    Not on file. Social History Main Topics    Smoking status: Former Smoker     Packs/day: 0.25     Years: 15.00     Quit date: 6/28/1996    Smokeless tobacco: Never Used    Alcohol use Yes      Comment: social    Drug use: No    Sexual activity: Not on file     Other Topics Concern    Not on file     Social History Narrative         ALLERGIES: Review of patient's allergies indicates no known allergies. Review of Systems   Constitutional: Negative for chills, diaphoresis, fatigue, fever and malaise/fatigue. HENT: Negative for congestion and rhinorrhea. Respiratory: Negative for cough, hemoptysis, sputum production and shortness of breath. Cardiovascular: Positive for chest pain. Negative for palpitations, orthopnea, claudication, leg swelling, PND and near-syncope. Gastrointestinal: Negative for abdominal pain, diarrhea, nausea and vomiting. Genitourinary: Negative for dysuria and flank pain. Musculoskeletal: Negative for back pain, gait problem, joint swelling, neck pain and neck stiffness. Skin: Negative for pallor and rash. Neurological: Positive for headaches. Negative for dizziness, syncope, weakness and numbness.        Vitals:    06/05/18 0706   BP: 133/69   Pulse: 75   Resp: 18   Temp: 98.7 °F (37.1 °C)   SpO2: 95%   Weight: 56.7 kg (125 lb)   Height: 5' 4\" (1.626 m)            Physical Exam   Constitutional: She is oriented to person, place, and time. She appears well-developed and well-nourished. No distress. Pt sitting up in bed in NAD. HENT:   Head: Normocephalic and atraumatic. Mouth/Throat: Oropharynx is clear and moist. No oropharyngeal exudate. Eyes: Conjunctivae and EOM are normal. Pupils are equal, round, and reactive to light. Neck: Normal range of motion. No JVD present. No tracheal deviation present. Cardiovascular: Normal rate, regular rhythm, normal heart sounds and intact distal pulses. Pulses 2+ and equal throughout. Pulmonary/Chest: Effort normal and breath sounds normal. No respiratory distress. She has no wheezes. She has no rales. She exhibits tenderness. CTAB. Mild central chest wall TTP. No crepitus. Abdominal: Soft. There is no tenderness. There is no rebound and no guarding. Soft, NTND. Musculoskeletal: Normal range of motion. She exhibits no edema, tenderness or deformity. Neurological: She is alert and oriented to person, place, and time. No cranial nerve deficit. Coordination normal.   Strength 5/5 throughout. Normal sensory exam. No facial droop. No dysarthria. Skin: No rash noted. No erythema. Nursing note and vitals reviewed. MDM  Number of Diagnoses or Management Options  Atypical chest pain: new and requires workup  Gastroesophageal reflux disease, esophagitis presence not specified: new and requires workup  Nonintractable headache, unspecified chronicity pattern, unspecified headache type: new and requires workup  Diagnosis management comments: DDx- GERD, ACS, PNA, PTX, Atypical CP, Pleurisy, Dissection ,etc.  Vital signs stable. Patient well-appearing. Chest x-ray with no acute findings. CT head negative. Initial & repeat troponins negative.  EKG with no acute findings. Discharge w/ close follow-up with primary care doctor and cardiologist.  Daughter and patient and agree with the plan. Patient ready for discharge at this time. Amount and/or Complexity of Data Reviewed  Clinical lab tests: ordered and reviewed  Tests in the radiology section of CPT®: ordered and reviewed  Tests in the medicine section of CPT®: ordered and reviewed  Review and summarize past medical records: yes  Independent visualization of images, tracings, or specimens: yes    Risk of Complications, Morbidity, and/or Mortality  Presenting problems: moderate  Diagnostic procedures: low  Management options: low    Patient Progress  Patient progress: stable        ED Course   Comment By Time   CT head Impression: Stable chronic appearing white matter change without acute intracranial abnormality.  Yoni Christopher MD 06/05 0818   CXR Impression: Unremarkable two-view chest. Yoni Christopher MD 06/05 0818       EKG  Date/Time: 6/5/2018 7:27 AM  Performed by: Pam Collins  Authorized by: Pam Collins     ECG reviewed by ED Physician in the absence of a cardiologist: yes    Rate:     ECG rate:  75    ECG rate assessment: normal    Rhythm:     Rhythm: sinus rhythm    Ectopy:     Ectopy: none    QRS:     QRS axis:  Normal    QRS intervals:  Normal  Conduction:     Conduction: normal    ST segments:     ST segments:  Normal  T waves:     T waves: normal        Results Include:    Recent Results (from the past 24 hour(s))   EKG, 12 LEAD, INITIAL    Collection Time: 06/05/18  7:05 AM   Result Value Ref Range    Ventricular Rate 75 BPM    Atrial Rate 75 BPM    P-R Interval 146 ms    QRS Duration 90 ms    Q-T Interval 396 ms    QTC Calculation (Bezet) 442 ms    Calculated P Axis 29 degrees    Calculated R Axis 33 degrees    Calculated T Axis 38 degrees    Diagnosis       Normal sinus rhythm  Normal ECG  When compared with ECG of 19-JUL-2016 01:15,  Minimal criteria for Anterior infarct are no longer Present  Borderline criteria for Inferior infarct are no longer Present     CBC WITH AUTOMATED DIFF    Collection Time: 06/05/18  7:08 AM   Result Value Ref Range    WBC 5.4 4.3 - 11.1 K/uL    RBC 4.15 4.05 - 5.25 M/uL    HGB 12.9 11.7 - 15.4 g/dL    HCT 37.8 35.8 - 46.3 %    MCV 91.1 79.6 - 97.8 FL    MCH 31.1 26.1 - 32.9 PG    MCHC 34.1 31.4 - 35.0 g/dL    RDW 12.4 11.9 - 14.6 %    PLATELET 710 892 - 317 K/uL    MPV 11.3 10.8 - 14.1 FL    DF AUTOMATED      NEUTROPHILS 62 43 - 78 %    LYMPHOCYTES 33 13 - 44 %    MONOCYTES 1 (L) 4.0 - 12.0 %    EOSINOPHILS 3 0.5 - 7.8 %    BASOPHILS 1 0.0 - 2.0 %    IMMATURE GRANULOCYTES 0 0.0 - 5.0 %    ABS. NEUTROPHILS 3.3 1.7 - 8.2 K/UL    ABS. LYMPHOCYTES 1.8 0.5 - 4.6 K/UL    ABS. MONOCYTES 0.1 0.1 - 1.3 K/UL    ABS. EOSINOPHILS 0.2 0.0 - 0.8 K/UL    ABS. BASOPHILS 0.0 0.0 - 0.2 K/UL    ABS. IMM. GRANS. 0.0 0.0 - 0.5 K/UL   METABOLIC PANEL, COMPREHENSIVE    Collection Time: 06/05/18  7:08 AM   Result Value Ref Range    Sodium 143 136 - 145 mmol/L    Potassium 3.9 3.5 - 5.1 mmol/L    Chloride 106 98 - 107 mmol/L    CO2 27 21 - 32 mmol/L    Anion gap 10 7 - 16 mmol/L    Glucose 132 (H) 65 - 100 mg/dL    BUN 9 8 - 23 MG/DL    Creatinine 0.53 (L) 0.6 - 1.0 MG/DL    GFR est AA >60 >60 ml/min/1.73m2    GFR est non-AA >60 >60 ml/min/1.73m2    Calcium 9.0 8.3 - 10.4 MG/DL    Bilirubin, total 0.3 0.2 - 1.1 MG/DL    ALT (SGPT) 20 12 - 65 U/L    AST (SGOT) 15 15 - 37 U/L    Alk.  phosphatase 117 50 - 136 U/L    Protein, total 7.6 6.3 - 8.2 g/dL    Albumin 3.4 3.2 - 4.6 g/dL    Globulin 4.2 (H) 2.3 - 3.5 g/dL    A-G Ratio 0.8 (L) 1.2 - 3.5     TROPONIN I    Collection Time: 06/05/18  7:08 AM   Result Value Ref Range    Troponin-I, Qt. <0.02 (L) 0.02 - 0.05 NG/ML   LIPASE    Collection Time: 06/05/18  7:17 AM   Result Value Ref Range    Lipase 209 73 - 393 U/L   POC TROPONIN-I    Collection Time: 06/05/18  9:09 AM   Result Value Ref Range    Troponin-I (POC) 0 (L) 0.02 - 0.05 ng/ml     Yoni Tam MD; 6/5/2018 @9:34 AM Voice dictation software was used during the making of this note. This software is not perfect and grammatical and other typographical errors may be present.   This note has not been proofread for errors.  ===================================================================

## 2018-06-05 NOTE — ED TRIAGE NOTES
Patient started having chest pain last night into the morning. Denies cardiac history or HTN. Reports headache as well. Describes the pain as a pulling sensation, denies radiation.

## 2018-07-06 ENCOUNTER — APPOINTMENT (OUTPATIENT)
Dept: CT IMAGING | Age: 78
End: 2018-07-06
Attending: EMERGENCY MEDICINE
Payer: COMMERCIAL

## 2018-07-06 ENCOUNTER — HOSPITAL ENCOUNTER (EMERGENCY)
Age: 78
Discharge: HOME | End: 2018-07-06
Attending: EMERGENCY MEDICINE
Payer: COMMERCIAL

## 2018-07-06 VITALS
HEIGHT: 64 IN | BODY MASS INDEX: 23.9 KG/M2 | DIASTOLIC BLOOD PRESSURE: 70 MMHG | RESPIRATION RATE: 15 BRPM | SYSTOLIC BLOOD PRESSURE: 170 MMHG | HEART RATE: 70 BPM | TEMPERATURE: 97.8 F | OXYGEN SATURATION: 97 % | WEIGHT: 140 LBS

## 2018-07-06 DIAGNOSIS — R42 DIZZINESS: ICD-10-CM
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
## 2018-07-06 DIAGNOSIS — S00.33XA CONTUSION OF NOSE, INITIAL ENCOUNTER: ICD-10-CM

## 2018-07-06 LAB
ALBUMIN SERPL-MCNC: 3.3 G/DL (ref 3.2–4.6)
ALBUMIN/GLOB SERPL: 0.8 {RATIO} (ref 1.2–3.5)
ALP SERPL-CCNC: 106 U/L (ref 50–136)
ALT SERPL-CCNC: 27 U/L (ref 12–65)
ANION GAP SERPL CALC-SCNC: 15 MMOL/L (ref 7–16)
AST SERPL-CCNC: 19 U/L (ref 15–37)
ATRIAL RATE: 72 BPM
BASOPHILS # BLD: 0 K/UL (ref 0–0.2)
BASOPHILS NFR BLD: 0 % (ref 0–2)
BILIRUB SERPL-MCNC: 0.2 MG/DL (ref 0.2–1.1)
BUN SERPL-MCNC: 23 MG/DL (ref 8–23)
CALCIUM SERPL-MCNC: 8.6 MG/DL (ref 8.3–10.4)
CALCULATED P AXIS, ECG09: 40 DEGREES
CALCULATED R AXIS, ECG10: 19 DEGREES
CALCULATED T AXIS, ECG11: 35 DEGREES
CHLORIDE SERPL-SCNC: 103 MMOL/L (ref 98–107)
CO2 SERPL-SCNC: 23 MMOL/L (ref 21–32)
CREAT SERPL-MCNC: 0.92 MG/DL (ref 0.6–1)
DIAGNOSIS, 93000: NORMAL
DIFFERENTIAL METHOD BLD: ABNORMAL
EOSINOPHIL # BLD: 0.1 K/UL (ref 0–0.8)
EOSINOPHIL NFR BLD: 2 % (ref 0.5–7.8)
ERYTHROCYTE [DISTWIDTH] IN BLOOD BY AUTOMATED COUNT: 12.5 % (ref 11.9–14.6)
GLOBULIN SER CALC-MCNC: 4.1 G/DL (ref 2.3–3.5)
GLUCOSE SERPL-MCNC: 288 MG/DL (ref 65–100)
HCT VFR BLD AUTO: 37.2 % (ref 35.8–46.3)
HGB BLD-MCNC: 12.7 G/DL (ref 11.7–15.4)
IMM GRANULOCYTES # BLD: 0 K/UL (ref 0–0.5)
IMM GRANULOCYTES NFR BLD AUTO: 0 % (ref 0–5)
LYMPHOCYTES # BLD: 1.8 K/UL (ref 0.5–4.6)
LYMPHOCYTES NFR BLD: 27 % (ref 13–44)
MCH RBC QN AUTO: 31.5 PG (ref 26.1–32.9)
MCHC RBC AUTO-ENTMCNC: 34.1 G/DL (ref 31.4–35)
MCV RBC AUTO: 92.3 FL (ref 79.6–97.8)
MONOCYTES # BLD: 0.4 K/UL (ref 0.1–1.3)
MONOCYTES NFR BLD: 6 % (ref 4–12)
NEUTS SEG # BLD: 4.3 K/UL (ref 1.7–8.2)
NEUTS SEG NFR BLD: 65 % (ref 43–78)
P-R INTERVAL, ECG05: 158 MS
PLATELET # BLD AUTO: 271 K/UL (ref 150–450)
PMV BLD AUTO: 11.3 FL (ref 10.8–14.1)
POTASSIUM SERPL-SCNC: 4 MMOL/L (ref 3.5–5.1)
PROT SERPL-MCNC: 7.4 G/DL (ref 6.3–8.2)
Q-T INTERVAL, ECG07: 400 MS
QRS DURATION, ECG06: 86 MS
QTC CALCULATION (BEZET), ECG08: 438 MS
RBC # BLD AUTO: 4.03 M/UL (ref 4.05–5.25)
SODIUM SERPL-SCNC: 141 MMOL/L (ref 136–145)
TROPONIN I SERPL-MCNC: <0.02 NG/ML (ref 0.02–0.05)
VENTRICULAR RATE, ECG03: 72 BPM
WBC # BLD AUTO: 6.7 K/UL (ref 4.3–11.1)

## 2018-07-06 PROCEDURE — 85025 COMPLETE CBC W/AUTO DIFF WBC: CPT | Performed by: EMERGENCY MEDICINE

## 2018-07-06 PROCEDURE — 99284 EMERGENCY DEPT VISIT MOD MDM: CPT | Performed by: EMERGENCY MEDICINE

## 2018-07-06 PROCEDURE — 80053 COMPREHEN METABOLIC PANEL: CPT | Performed by: EMERGENCY MEDICINE

## 2018-07-06 PROCEDURE — 72125 CT NECK SPINE W/O DYE: CPT

## 2018-07-06 PROCEDURE — 70450 CT HEAD/BRAIN W/O DYE: CPT

## 2018-07-06 PROCEDURE — 70486 CT MAXILLOFACIAL W/O DYE: CPT

## 2018-07-06 PROCEDURE — 84484 ASSAY OF TROPONIN QUANT: CPT | Performed by: EMERGENCY MEDICINE

## 2018-07-06 PROCEDURE — 93005 ELECTROCARDIOGRAM TRACING: CPT | Performed by: EMERGENCY MEDICINE

## 2018-07-06 NOTE — DISCHARGE INSTRUCTIONS
Ranell Northern: Instrucciones de cuidado - [ Bruised Face: Care Instructions ]  Instrucciones de cuidado  Usted puede tener zaida magulladura en la lion si se  o si algo lo golpea en la lion. El término médico para zaida magulladura es \"contusión\". Pequeños vasos sanguíneos se desgarran y juan diego escapar luis f bajo la piel. Barry Vandana de las personas se imaginan zaida magulladura arley zaida vivek louis y libertad (moretón). Kathe los [employer] y los músculos también pueden [place]. [place] puede dañar los tejidos profundos, kathe no causar un moretón visible. Tong médico lo examinará y le presionará suavemente la lion para encontrar zonas que estén sensibles. Él o junior le revisará los ojos, lo carlin que usted puede  los músculos faciales cerca de la magulladura y tong sensibilidad alrededor de la cecy para asegurarse de que no haya zaida lesión más grave, arley un hueso fracturado o un daño nervioso. Es posible que le rossy pruebas, incluyendo radiografías u otras pruebas por imágenes, arley zaida tomografía computarizada o zaida resonancia magnética. Las magulladuras pueden causar dolor e Suzette More. Kathe si no hay otro daño, generalmente mejorarán en algunas semanas con tratamiento en el hogar. La atención de seguimiento es zaida parte clave de tong tratamiento y seguridad. Asegúrese de hacer y acudir a todas las citas, y llame a tong médico si está teniendo problemas. También es zaida buena idea saber los resultados de mau exámenes y mantener zaida lista de los medicamentos que patti. ¿Cómo puede cuidarse en el hogar? · Colóquese hielo o zaida compresa fría en la lion de 10 a 20 minutos cada vez. Póngase un paño jerez entre el hielo y la piel. Trate de hacerlo cada 1 o 2 horas willard los primeros 3 días (cuando esté despierto) o hasta que la hinchazón baje. · Duerma con la serjio un poco elevada hasta que la hinchazón baje. Apoye la serjio y los hombros [place].   · Si practica deportes de contacto, pregúntele a tong médico cuándo puede volver a jugar. Lo más seguro es esperar al menos 6 semanas. · Sea vance con los medicamentos. Naye y siga todas las instrucciones de la [place]. ¨ Si el médico le recetó un analgésico (medicamento para el dolor), tómelo según las indicaciones. ¨ Si no está tomando un analgésico recetado, pregúntele a tong médico si puede dilan amanda de [employer]. ¿Cuándo debe pedir ayuda? Llame a tong médico ahora mismo o busque atención médica inmediata si:  · El dolor empeora. · Tiene hinchazón nueva o peor. · Tiene sangrado nuevo o peor. · Siente hormigueo, debilidad o entumecimiento en la cecy cerca de la magulladura. · Tiene cambios en la vista. Preste especial atención a los cambios en tong tiffany y asegúrese de comunicarse con tong médico si:  · No mejora arley se esperaba. ¿Dónde puede encontrar más información en inglés? Ovid Alyssa a http://anil-arnol.info/. May Doan M756 en la búsqueda para aprender más acerca de \"Charu magullada: Instrucciones de cuidado - [ Bruised Face: Care Instructions ]. \"  Revisado:  2017  Versión del contenido: 11.4  © 7135-2650 Healthwise, Incorporated. Las instrucciones de cuidado fueron adaptadas bajo licencia por Good Help Connections (which disclaims liability or warranty for this information). Si usted tiene [place] afección médica o sobre estas instrucciones, siempre pregunte a tong profesional de tiffany. Healthwise, Incorporated niega toda garantía o responsabilidad por tong uso de esta información. Mareos en niños: Instrucciones de cuidado - [ Dizziness in Children: Care Instructions ]  Instrucciones de [place] son [place] sensación de confusión en la serjio. No son lo mismo que tener vértigo. Eso es zaida sensación de que la habitación gira o de que usted se mueve o . Y son diferentes de sentirse aturdido. Eso es la sensación de que está a punto de [place]. Puede resultar difícil conocer la causa de los Laurita.  Mary Bustamante fiebre, gripe u otra enfermedad puede hacer que tong hijo se sienta mareado. No beber suficientes líquidos (deshidratación) también puede causarlos. Algunas afecciones poco comunes, arley problemas del corazón, pueden hacer que un wallace se sienta [place]. Muchos medicamentos pueden causar [employer]V. Estos incluyen los que tong hijo pueda estar tomando para el trastorno por déficit de atención con hiperactividad o ADHD, por mau siglas en inglés. Si es un medicamento el que causa los síntomas de tong hijo, tong médico puede decirle que lo cambie o que deje de tomarlo. Si no hay zaida razón audra para los síntomas de tong hijo, tong médico puede sugerir vigilar y esperar. [place] significa esperar willard un tiempo para desirae si el problema desaparece por sí solo. La atención de seguimiento es zaida parte clave del tratamiento y la seguridad de tong hijo. Asegúrese de hacer y acudir a todas las citas, y llame a tong médico si tong hijo está teniendo problemas. También es zaida buena idea saber los resultados de los exámenes de tong hijo y mantener zaida lista de los medicamentos que patti. ¿Cómo puede cuidar de tong hijo en el hogar? · Si tong médico le sugiere o le receta medicamentos, déselos exactamente según las indicaciones. Llame a tong médico si pranay que tong hijo está teniendo un problema con mau medicamentos. · Si tong hijo puede manejar, no lo deje manejar mientras esté mareado. ¿Cuándo debe pedir ayuda? Llame al 911 en cualquier momento que considere que tong hijo necesita atención de emergencia. Por ejemplo, llame si:  ? · Tong hijo se desmaya (pierde el conocimiento). ?Llame a tong médico ahora mismo o busque atención médica inmediata si:  ? · Tong hijo se siente mareado y [facility], [facility] o zumbido [employer] oídos. ? · Tong hijo tiene nuevas náuseas y vómitos o [address] ? · Los mareos no desaparecen o regresan.    ?Preste especial atención a los [employer] tiffany de tong hijo y asegúrese de comunicarse con tong médico si:  ? · Tong hijo no mejora arley se esperaba. ¿Dónde puede encontrar más información en inglés? Ольга Metcalf a http://anil-arnol.info/. Sussylemuel Melvi [id number] en la búsqueda para aprender más acerca de \"Mareos en niños: Instrucciones de cuidado - [ Dizziness in Children: Care Instructions ]. \"  Revisado: [facility] 2017  Versión del contenido: 11.4  © 7855-4426 Healthwise, Incorporated. Las instrucciones de cuidado fueron adaptadas bajo licencia por [employer] Connections (which disclaims liability or warranty for this information). Si usted tiene [place] afección médica o sobre estas instrucciones, siempre pregunte a tong profesional de tiffany. Healthwise, Incorporated niega toda garantía o responsabilidad por tong uso de esta información. Prevención de caídas: Instrucciones de cuidado - [ Preventing Falls: Care Instructions ]  Instrucciones de cuidado    Caminar por tong casa de manera cruz puede convertirse en un desafío, sobre todo si tiene lesiones o problemas de tiffany que puedan hacer que se caiga con Michael Rumple. Las alfombras sueltas y los muebles en los pasillos se encuentran entre los peligros que enfrentan muchas personas mayores que tienen problemas para caminar o de la visión. Las [employer] tienen afecciones arley artritis, osteoporosis o demencia, también deben tener cuidado de no caerse. Usted puede hacer que tong hogar sea más seguro si patti algunas medidas sencillas. La atención de seguimiento es zaida parte clave de tong tratamiento y seguridad. Asegúrese de hacer y acudir a todas las citas, y llame a tong médico si está teniendo problemas. También es zaida buena idea saber los resultados de los exámenes y mantener zaida lista de los medicamentos que patti. ¿Cómo puede cuidarse en el hogar? Cómo cuidarse  · Podría marearse si no sravan suficiente agua. Para prevenir la deshidratación, yesika abundantes líquidos, los suficientes para que tong orina sea de color amarillo shital o transparente arley el agua. Elija dilan agua y otros líquidos ursula sin cafeína. Si tiene zaida enfermedad del riñón, del corazón o del hígado y tiene que [place]'s líquidos, hable con tong médico antes de aumentar tong consumo. · Therese ejercicio con regularidad para mejorar tong fortaleza, tong cricket muscular y tong estabilidad. Camine, si puede. Nadar podría ser [place] buena alternativa si le thom trabajo caminar. · Hágase un examen de la visión y la audición cada año o cada vez que note un cambio. Si tiene dificultades para desirae y oír, es posible que no pueda evitar objetos y podría perder tong equilibrio. · Conozca los efectos secundarios de los medicamentos que patti. Pregúntele a tong médico o tong farmacéutico si los medicamentos que patti pueden afectar tong equilibrio. Las pastillas para dormir o los sedantes pueden afectar tong equilibrio. · Limite la cantidad de alcohol que sravan. El alcohol puede alterar tong equilibrio y otros sentidos. · Pregúntele a tong médico si los callos o las callosidades deben ser eliminados de mau pies. Si [place] un calzado holgado a causa de los callos o las callosidades, podría perder el equilibrio y caerse. · Hable con tong médico si tiene entumecimiento en los pies. [address] en el hogar  · Quite escalones en la elizabeth de entrada, alfombrillas y obstáculos. Fije las alfombras sueltas o repare las áreas levantadas del piso. · Mueva los muebles y los cables eléctricos para que no estén en los pasillos. · Utilice cera antideslizante para pisos, y seque de inmediato cualquier derrame que se produzca, sobre todo si el piso es de baldosas de cerámica. · Si [place] zaida andadera o un bastón, colóquele un revestimiento de goma en las puntas. Si utiliza muletas, limpie la base regularmente con un paño abrasivo, arley un estropajo de godwin. · Mantenga la casa carlin christine, sobre todo las [place], los porches y los pasillos exteriores. Utilice lamparitas nocturnas en áreas arley vestíbulos y shyanne.  Ponga interruptores de stephany adicionales o utilice interruptores a distancia (arley los que se encienden o apagan al aplaudir) para que sea más fácil encender las luces si tiene que levantarse por las noches. · Instale pasamanos o barandillas sólidos en las escaleras. · Ponga los artículos que más utiliza en los estantes bajos de los gabinetes (aproximadamente a la altura de tong cintura). · Tenga un teléfono inalámbrico y [facility] lin[facility] Hospital con baterías nuevas cerca de tong cama. Si es posible, coloque un teléfono en cada zaida de las habitaciones de tong casa, o lleve siempre un celular en willa de que se caiga y no pueda llegar al teléfono. O puede usar un dispositivo en el theo o la ernesto en el que presione un botón para enviar zaida señal pidiendo [place]. · Use zapatos de tacón bajo que le queden carlin y le den buen apoyo a mau pies. Use calzado con suelas antideslizantes. Revise los tacones y las suelas de mau zapatos antes de usarlos. Repare o reemplace los tacones o las suelas desgastados. · No camine en medias sobre [facility]. · Camine por el [facility] aceras estén resbalosas. Si vive en zaida localidad donde hay toni y hielo en invierno, coloque sal sobre los escalones y las aceras resbaladizos. Cómo prevenir las caídas en el baño  · Instale agarraderas y tapetes antideslizantes dentro y fuera de la ducha o la bentley, así arley cerca del inodoro y el lavabo. · Utilice zaida silla para la ducha y un banco para la bañera. · Use zaida serjio de ducha portátil que le permite sentarse mientras se ducha. · Cuando vaya a entrar en la bentley o la ducha, coloque adelaida la pierna más débil. Cuando vaya a salir de la ducha o la bentley, hágalo adelaida con el lado más jose. · Repare los asientos de inodoro sueltos y considere instalar un asiento de inodoro elevado para que sea más fácil sentarse y levantarse del inodoro. · No cierre con llave la elizabeth del baño mientras se ducha. ¿Dónde puede encontrar más información en inglés?   Félix Kelly a http://anil-arnol.info/. Phaniangela Fay [id number] en la búsqueda para aprender más acerca de \"Prevención de caídas: Instrucciones de cuidado - [ Preventing Falls: Care Instructions ]. \"  Revisado: 12 mayo, 2017  Versión del contenido: 11.4  © 1000-7388 Healthwise, ACTON. Las instrucciones de cuidado fueron adaptadas bajo licencia por Good Help Connections (which disclaims liability or warranty for this information). Si usted tiene [place] afección médica o sobre estas instrucciones, siempre pregunte a tong profesional de tiffany. [employer], ACTON niega toda garantía o responsabilidad por tong uso de esta información.

## 2018-07-06 NOTE — ED PROVIDER NOTES
HPI Comments: Patient presents to the ER complaining of recurrent falls and dizziness. Daughter reports patient had a fall today while in the parking lot. Reports she landed face first.  There is questionable loss of consciousness. Patient has been ambulatory since fall. Has noticed significant pain and swelling in nose. Also reports some neck pain. Denies any chest pain or abdominal pain    Patient is a 76 y.o. female presenting with dizziness and fall. The history is provided by the patient. Dizziness   This is a recurrent problem. The current episode started more than 1 week ago. The problem has not changed since onset. Pertinent negatives include no speech difficulty. Pertinent negatives include no shortness of breath, no confusion and no choking. Fall   The accident occurred 3 to 5 hours ago. The fall occurred while walking. The point of impact was the head. The pain is present in the head. The pain is at a severity of 3/10. She was ambulatory at the scene. Pertinent negatives include no fever, no numbness, no abdominal pain, no extremity weakness, no tingling and no laceration. The risk factors include recurrent falls. Past Medical History:   Diagnosis Date    Anxiety     Depression     Diabetes mellitus type 2, uncontrolled (HCC)     History of bilateral cataract extraction     History of coronary artery disease     History of gallbladder disease     Hypercholesterolemia     Hyperlipidemia     Hypertension     Osteopenia     Wears dentures        Past Surgical History:   Procedure Laterality Date    HX CATARACT REMOVAL Bilateral     HX CHOLECYSTECTOMY           Family History:   Problem Relation Age of Onset    No Known Problems Mother     No Known Problems Father        Social History     Social History    Marital status: SINGLE     Spouse name: N/A    Number of children: N/A    Years of education: N/A     Occupational History    Not on file.      Social History Main Topics  Smoking status: Former Smoker     Packs/day: 0.25     Years: 15.00     Quit date: 6/28/1996    Smokeless tobacco: Never Used    Alcohol use Yes      Comment: social    Drug use: No    Sexual activity: Not on file     Other Topics Concern    Not on file     Social History Narrative         ALLERGIES: Review of patient's allergies indicates no known allergies. Review of Systems   Constitutional: Negative for fatigue, fever and unexpected weight change. HENT: Negative for congestion and dental problem. Eyes: Negative for photophobia, redness and visual disturbance. Respiratory: Negative for choking, chest tightness and shortness of breath. Cardiovascular: Negative for palpitations and leg swelling. Gastrointestinal: Negative for abdominal pain. Endocrine: Negative for polydipsia and polyphagia. Genitourinary: Negative for flank pain, frequency and urgency. Musculoskeletal: Positive for gait problem. Negative for back pain and extremity weakness. Skin: Negative for pallor and rash. Neurological: Positive for dizziness. Negative for tingling, syncope, speech difficulty and numbness. Hematological: Negative for adenopathy. Does not bruise/bleed easily. Psychiatric/Behavioral: Negative for behavioral problems and confusion. All other systems reviewed and are negative. Vitals:    07/06/18 0019   BP: 173/72   Pulse: 74   Resp: 16   Temp: 97.8 °F (36.6 °C)   SpO2: 96%   Weight: 63.5 kg (140 lb)   Height: 5' 4\" (1.626 m)            Physical Exam   Constitutional: She is oriented to person, place, and time. She appears well-developed and well-nourished. HENT:   Head: Macrocephalic. Nose: Nose lacerations, sinus tenderness and nasal deformity present. No nasal septal hematoma. Eyes: Conjunctivae and EOM are normal. Pupils are equal, round, and reactive to light. Cardiovascular: Normal rate and regular rhythm.     Pulmonary/Chest: Effort normal and breath sounds normal. Musculoskeletal: Normal range of motion. She exhibits no edema or deformity. Neurological: She is alert and oriented to person, place, and time. She has normal reflexes. Skin: No laceration noted. Nursing note and vitals reviewed.        MDM  Number of Diagnoses or Management Options  Diagnosis management comments: We'll check basic labs and urinalysis given her reported dizziness and frequent falls  Also will obtain CT scan of head, face as well as cervical spine    2:31 AM  Normal basic labs including chemistry panel, CBC as well as troponin  CT scan of the face, head as well as cervical spine shows no acute abnormalities    Will Discharge home, encourage follow-up with PCP       Amount and/or Complexity of Data Reviewed  Clinical lab tests: ordered and reviewed  Tests in the radiology section of CPT®: ordered and reviewed    Risk of Complications, Morbidity, and/or Mortality  Presenting problems: moderate  Diagnostic procedures: moderate  Management options: moderate    Patient Progress  Patient progress: stable        ED Course       Procedures

## 2018-07-06 NOTE — ED TRIAGE NOTES
\"She felt dizzy and she fell forward and hit her nose and her knees and her hands.   She was going to the store and they said she passed out\"

## 2018-07-06 NOTE — ED NOTES
I have reviewed discharge instructions with the patient and caregiver. The patient and caregiver verbalized understanding. Patient left ED via Discharge Method: ambulatory to Home with (insert name of family/friend, self, transport self). Opportunity for questions and clarification provided. Patient given 0 scripts. To continue your aftercare when you leave the hospital, you may receive an automated call from our care team to check in on how you are doing. This is a free service and part of our promise to provide the best care and service to meet your aftercare needs.  If you have questions, or wish to unsubscribe from this service please call [phone]. Thank you for Choosing our [employer] [facility] Emergency Department.

## 2019-02-12 ENCOUNTER — HOSPITAL ENCOUNTER (EMERGENCY)
Age: 79
Discharge: HOME | End: 2019-02-12
Attending: EMERGENCY MEDICINE
Payer: COMMERCIAL

## 2019-02-12 VITALS
DIASTOLIC BLOOD PRESSURE: 74 MMHG | HEIGHT: 64 IN | BODY MASS INDEX: 23.9 KG/M2 | HEART RATE: 85 BPM | OXYGEN SATURATION: 98 % | WEIGHT: 140 LBS | TEMPERATURE: 98.1 F | RESPIRATION RATE: 18 BRPM | SYSTOLIC BLOOD PRESSURE: 162 MMHG

## 2019-02-12 DIAGNOSIS — E16.2 HYPOGLYCEMIA: Primary | ICD-10-CM

## 2019-02-12 LAB
ANION GAP SERPL CALC-SCNC: 9 MMOL/L (ref 7–16)
ATRIAL RATE: 77 BPM
BASOPHILS # BLD: 0 K/UL (ref 0–0.2)
BASOPHILS NFR BLD: 0 % (ref 0–2)
BUN SERPL-MCNC: 24 MG/DL (ref 8–23)
CALCIUM SERPL-MCNC: 8.4 MG/DL (ref 8.3–10.4)
CALCULATED P AXIS, ECG09: 55 DEGREES
CALCULATED R AXIS, ECG10: 22 DEGREES
CALCULATED T AXIS, ECG11: 37 DEGREES
CHLORIDE SERPL-SCNC: 107 MMOL/L (ref 98–107)
CO2 SERPL-SCNC: 25 MMOL/L (ref 21–32)
CREAT SERPL-MCNC: 0.6 MG/DL (ref 0.6–1)
DIAGNOSIS, 93000: NORMAL
DIFFERENTIAL METHOD BLD: ABNORMAL
EOSINOPHIL # BLD: 0 K/UL (ref 0–0.8)
EOSINOPHIL NFR BLD: 1 % (ref 0.5–7.8)
ERYTHROCYTE [DISTWIDTH] IN BLOOD BY AUTOMATED COUNT: 12.3 % (ref 11.9–14.6)
GLUCOSE BLD STRIP.AUTO-MCNC: 122 MG/DL (ref 65–100)
GLUCOSE BLD STRIP.AUTO-MCNC: 130 MG/DL (ref 65–100)
GLUCOSE BLD STRIP.AUTO-MCNC: 137 MG/DL (ref 65–100)
GLUCOSE SERPL-MCNC: 128 MG/DL (ref 65–100)
HCT VFR BLD AUTO: 35.4 % (ref 35.8–46.3)
HGB BLD-MCNC: 12.2 G/DL (ref 11.7–15.4)
IMM GRANULOCYTES # BLD AUTO: 0 K/UL (ref 0–0.5)
IMM GRANULOCYTES NFR BLD AUTO: 0 % (ref 0–5)
LYMPHOCYTES # BLD: 1 K/UL (ref 0.5–4.6)
LYMPHOCYTES NFR BLD: 12 % (ref 13–44)
MCH RBC QN AUTO: 31.9 PG (ref 26.1–32.9)
MCHC RBC AUTO-ENTMCNC: 34.5 G/DL (ref 31.4–35)
MCV RBC AUTO: 92.7 FL (ref 79.6–97.8)
MONOCYTES # BLD: 0.7 K/UL (ref 0.1–1.3)
MONOCYTES NFR BLD: 8 % (ref 4–12)
NEUTS SEG # BLD: 6.8 K/UL (ref 1.7–8.2)
NEUTS SEG NFR BLD: 79 % (ref 43–78)
NRBC # BLD: 0 K/UL (ref 0–0.2)
P-R INTERVAL, ECG05: 180 MS
PLATELET # BLD AUTO: 248 K/UL (ref 150–450)
PMV BLD AUTO: 12.3 FL (ref 9.4–12.3)
POTASSIUM SERPL-SCNC: 3.8 MMOL/L (ref 3.5–5.1)
Q-T INTERVAL, ECG07: 388 MS
QRS DURATION, ECG06: 90 MS
QTC CALCULATION (BEZET), ECG08: 439 MS
RBC # BLD AUTO: 3.82 M/UL (ref 4.05–5.2)
SODIUM SERPL-SCNC: 141 MMOL/L (ref 136–145)
VENTRICULAR RATE, ECG03: 77 BPM
WBC # BLD AUTO: 8.6 K/UL (ref 4.3–11.1)

## 2019-02-12 PROCEDURE — 80048 BASIC METABOLIC PNL TOTAL CA: CPT

## 2019-02-12 PROCEDURE — 99285 EMERGENCY DEPT VISIT HI MDM: CPT | Performed by: EMERGENCY MEDICINE

## 2019-02-12 PROCEDURE — 82962 GLUCOSE BLOOD TEST: CPT

## 2019-02-12 PROCEDURE — 93005 ELECTROCARDIOGRAM TRACING: CPT | Performed by: EMERGENCY MEDICINE

## 2019-02-12 PROCEDURE — 85025 COMPLETE CBC W/AUTO DIFF WBC: CPT

## 2019-02-12 NOTE — PROGRESS NOTES
present for assessment with Dr. Ivis Currie and EKG. Thank you, Garner Najjar, [facility] / 
Mary Hull Patient Relations & Interpreting Services 
c: [phone] / [email or web address] Ross [address] / [place], [address] [facility] 
[email or web address]. [facility]

## 2019-02-12 NOTE — PROGRESS NOTES
present for registration and triage. Thank you, SANJANA Encinas / 
Whitney Lopez Patient Relations & Interpreting Services 
c: [phone] / [email or web address] Stephanie [address] / [place], [facility] 
[email or web address]. [facility]

## 2019-02-12 NOTE — ED PROVIDER NOTES
Patient with history of diabetes. Before bed had a blood sugar greater than 300. Took 15 units of insulin and went to bed. Woke with nausea fatigue and weakness and not feeling well. Blood sugar was 32. EMS was called out and brought patient here. Daughter states patient had a syncopal episode while lying on the floor awaiting EMS arrival.  EMS gave something for the blood sugar in route. Blood sugar on arrival was 130. Patient feels much better here. The history is provided by the patient and a relative. A  was used. Nausea This is a new problem. The current episode started 3 to 5 hours ago. The problem has been resolved. There has been no fever. Pertinent negatives include no chills, no fever, no abdominal pain, no diarrhea, no headaches, no myalgias, no URI and no headaches. Her past medical history is significant for DM. Past Medical History:  
Diagnosis Date  Anxiety  Depression  Diabetes mellitus type 2, uncontrolled ([facility] [address]  History of bilateral cataract extraction  History of coronary artery disease  History of gallbladder disease  Hypercholesterolemia  Hyperlipidemia  Hypertension  Osteopenia  Wears dentures Past Surgical History:  
Procedure Laterality Date  HX CATARACT REMOVAL Bilateral   
 HX CHOLECYSTECTOMY Family History:  
Problem Relation Age of Onset  No Known Problems Mother  No Known Problems Father Social History Socioeconomic History  Marital status: SINGLE Spouse name: Not on file  Number of children: Not on file  Years of education: Not on file  Highest education level: Not on file Social Needs  Financial resource strain: Not on file  Food insecurity - worry: Not on file  Food insecurity - inability: Not on file  Transportation needs - medical: Not on file  Transportation needs - non-medical: Not on file Occupational History  Not on file Tobacco Use  Smoking status: Former Smoker Packs/day: 0.25 Years: 15.00 Pack years: 3.75 Last attempt to quit: 1996 Years since quittin.6  Smokeless tobacco: Never Used Substance and Sexual Activity  Alcohol use: Yes Comment: social  
 Drug use: No  
 Sexual activity: Not on file Other Topics Concern  Not on file Social History Narrative  Not on file ALLERGIES: Patient has no known allergies. Review of Systems Constitutional: Negative for chills and fever. HENT: Negative for rhinorrhea and sore throat. Eyes: Negative for pain and redness. Respiratory: Negative for chest tightness, shortness of breath and wheezing. Cardiovascular: Negative for chest pain and leg swelling. Gastrointestinal: Positive for nausea. Negative for abdominal pain, diarrhea and vomiting. Genitourinary: Negative for dysuria and hematuria. Musculoskeletal: Negative for back pain, gait problem, myalgias, neck pain and neck stiffness. Skin: Negative for color change and rash. Neurological: Positive for weakness and light-headedness. Negative for numbness and headaches. Vitals:  
 19 0207 BP: 137/66 Pulse: 85 Resp: 18 Temp: 98.1 °F (36.7 °C) SpO2: 98% Weight: 63.5 kg (140 lb) Height: 5' 4\" (1.626 m) Physical Exam  
Constitutional: She is oriented to person, place, and time. She appears well-developed and well-nourished. HENT:  
Head: Normocephalic and atraumatic. Eyes: EOM are normal. Pupils are equal, round, and reactive to light. Neck: Normal range of motion. Neck supple. Cardiovascular: Normal rate and regular rhythm. Pulmonary/Chest: Effort normal and breath sounds normal.  
Abdominal: Soft. Bowel sounds are normal. There is no tenderness. Musculoskeletal: Normal range of motion. She exhibits no edema. Neurological: She is alert and oriented to person, place, and time.  No cranial nerve deficit. Skin: Skin is warm and dry. MDM Number of Diagnoses or Management Options Diagnosis management comments: Hypoglycemia after insulin. Maintaining here. Will discharge. Amount and/or Complexity of Data Reviewed Clinical lab tests: ordered and reviewed Tests in the medicine section of CPT®: reviewed Patient Progress Patient progress: stable Procedures EKG: normal sinus rhythm, nonspecific ST and T waves changes. Rate 77. Results Include: 
 
Recent Results (from the past 24 hour(s)) GLUCOSE, POC Collection Time: 02/12/19  2:09 AM  
Result Value Ref Range Glucose (POC) 130 (H) 65 - 100 mg/dL CBC WITH AUTOMATED DIFF Collection Time: 02/12/19  2:15 AM  
Result Value Ref Range WBC 8.6 4.3 - 11.1 K/uL  
 RBC 3.82 (L) 4.05 - 5.2 M/uL  
 HGB 12.2 11.7 - 15.4 g/dL HCT 35.4 (L) 35.8 - 46.3 % MCV 92.7 79.6 - 97.8 FL  
 MCH 31.9 26.1 - 32.9 PG  
 MCHC 34.5 31.4 - 35.0 g/dL  
 RDW 12.3 11.9 - 14.6 % PLATELET 998 321 - 673 K/uL MPV 12.3 9.4 - 12.3 FL ABSOLUTE NRBC 0.00 0.0 - 0.2 K/uL  
 DF AUTOMATED NEUTROPHILS 79 (H) 43 - 78 % LYMPHOCYTES 12 (L) 13 - 44 % MONOCYTES 8 4.0 - 12.0 % EOSINOPHILS 1 0.5 - 7.8 % BASOPHILS 0 0.0 - 2.0 % IMMATURE GRANULOCYTES 0 0.0 - 5.0 %  
 ABS. NEUTROPHILS 6.8 1.7 - 8.2 K/UL  
 ABS. LYMPHOCYTES 1.0 0.5 - 4.6 K/UL  
 ABS. MONOCYTES 0.7 0.1 - 1.3 K/UL  
 ABS. EOSINOPHILS 0.0 0.0 - 0.8 K/UL  
 ABS. BASOPHILS 0.0 0.0 - 0.2 K/UL  
 ABS. IMM. GRANS. 0.0 0.0 - 0.5 K/UL METABOLIC PANEL, BASIC Collection Time: 02/12/19  2:15 AM  
Result Value Ref Range Sodium 141 136 - 145 mmol/L Potassium 3.8 3.5 - 5.1 mmol/L Chloride 107 98 - 107 mmol/L  
 CO2 25 21 - 32 mmol/L Anion gap 9 7 - 16 mmol/L Glucose 128 (H) 65 - 100 mg/dL BUN 24 (H) 8 - 23 MG/DL Creatinine 0.60 0.6 - 1.0 MG/DL  
 GFR est AA >60 >60 ml/min/1.73m2 GFR est non-AA >60 >60 ml/min/1.73m2 Calcium 8.4 8.3 - 10.4 MG/DL

## 2019-02-12 NOTE — DISCHARGE INSTRUCTIONS
Patient Education        Hipoglucemia: Suzy Pollard - [ Hypoglycemia: Care Instructions ]  Instrucciones de cuidado    Hipoglucemia significa que tong nivel de azúcar en la luis f es bajo y que tong organismo no recibe suficiente energía. Algunas personas tienen un nivel bajo de azúcar en la luis f por no comer con zaida frecuencia suficiente. Algunos medicamentos para tratar la diabetes pueden causar un nivel bajo de azúcar en la luis f. Las personas que toledo tenido zaida cirugía en el estómago o los intestinos podrían tener hipoglucemia. Problemas con el páncreas, los riñones o el hígado también pueden causar un nivel bajo de azúcar en la luis f. Un refrigerio o zaida bebida que contenga azúcar aumentarán tong nivel de azúcar en la luis f y debería aliviar mau síntomas de inmediato. Es posible que tong médico le recomiende que cambie mau medicamentos o deje de tomarlos hasta que los niveles de azúcar en la luis f estén bajo control. A gerda plazo, es posible que necesite cambiar tong dieta y mau hábitos alimentarios de forma que obtenga suficiente energía para tong cuerpo a lo gerda del día. La atención de seguimiento es zaida parte clave de tong tratamiento y seguridad. Asegúrese de hacer y acudir a todas las citas, y llame a tong médico si está teniendo problemas. También es zaida buena idea saber los resultados de mau exámenes y mantener zaida lista de los medicamentos que patti. ¿Cómo puede cuidarse en el hogar? · Aprenda a reconocer las señales tempranas de un nivel bajo de azúcar en la luis f. Las señales incluyen:  ? Náuseas. ? Hambre. ? Sentirse nervioso, irritable o tembloroso. ? Piel fría, pegajosa y [place]. ? Sudoración (cuando no está haciendo ejercicio). ? Latidos rápidos del corazón. ? Entumecimiento u hormigueo de las puntas de los dedos o los labios.   · Si siente que va a tener un episodio de azúcar bajo en la luis f, yesika jugo de fruta o zaida soda endulzada (no de dieta) o coma azúcar en forma de dulces, cubos o tabletas. Las uvas pasas son otro alimento que provee azúcar rápido. · Coma comidas pequeñas con frecuencia para no sentir [employer] comidas. · Equilibre el ejercicio adicional con [employer]. · Lleve un registro por escrito de mau episodios de azúcar bajo en la luis f, incluyendo cuándo fue juarez última comida y qué comió, para que pueda aprender qué es lo que provoca que le baje el azúcar en la Guido. · Asegúrese de que mau familiares, amigos y compañeros de trabajo conozcan los síntomas de un nivel bajo de azúcar en la luis f y sepan qué hacer para elevarlo. · Use un collar o brazalete de alerta médica que mencione juarez afección. Estos productos pueden comprarse en la mayoría de las [facility]. ¿Cuándo debe pedir ayuda? Llame al 911 en cualquier momento que considere que necesita atención de [place]. Por ejemplo, llame si:    · Se desmayó (perdió el conocimiento).     · Está confuso o no puede pensar con claridad.     · Juarez nivel de azúcar en la luis f es muy alto o muy bajo.    Preste especial atención a los cambios en juarez tiffany y asegúrese de comunicarse con juarez médico si:    · Juarez nivel de azúcar en la luis f permanece fuera de los límites ideales que el médico graham establecido para usted.     · Tiene cualquier problema. ¿Dónde puede encontrar más información en inglés? Sarbjit hdez [email or web address]://anil-arnol.info/. Beny Huber S783 en la búsqueda para aprender más acerca de \"Hipoglucemia: Instrucciones de cuidado - [ Hypoglycemia: Care Instructions ]. \"  Revisado: 25 michael, 2018  Versión del contenido: 11.9  © 3968-2979 [employer], [employer]. Las instrucciones de cuidado fueron adaptadas bajo licencia por [employer] (which disclaims liability or warranty for this information). Si usted tiene [place] afección médica o sobre estas instrucciones, siempre pregunte a juarez profesional de tiffany.  [employer], [employer] niega toda garantía o responsabilidad por juarez uso de [employer].

## 2019-02-12 NOTE — ED NOTES
I have reviewed discharge instructions with the patient. The patient verbalized understanding. Patient left ED via Discharge Method: ambulatory to Home with (insert name of family/friend, self, transport medicaid van). Opportunity for questions and clarification provided. Patient given 0 scripts. To continue your aftercare when you leave the hospital, you may receive an automated call from our care team to check in on how you are doing. This is a free service and part of our promise to provide the best care and service to meet your aftercare needs.  If you have questions, or wish to unsubscribe from this service please call [phone]. Thank you for Choosing our [facility] Emergency Department.

## 2019-02-12 NOTE — ED TRIAGE NOTES
Patient presents from home with daughter. Daughter states that her BGL was high, \"400 something\", and daughter proceeded to give her 15 units of insulin. Daughter then states that a short while later patients BGL was \"30 something\". Daughter states MD instructed her to give 15 units Novolog if BGL was high, regardless of number. Daughter also states that patient had a syncopal episode after BGL dropped. Patient states she is still a little dizzy, and nauseous.

## 2019-02-12 NOTE — PROGRESS NOTES
present for assessments with nurse, re-evaluation with Dr. Leslye Markham, and discharge instructions. Thank you, SANJANA Dash / 
Pablo Muse Patient Relations & Interpreting Services 
c: [phone] / [email or web address] Preston [address] [facility] [address] / [place], [address] [facility] 
[email or web address]. [facility]

## 2019-04-02 ENCOUNTER — HOSPITAL ENCOUNTER (EMERGENCY)
Age: 79
Discharge: HOME | End: 2019-04-02
Payer: COMMERCIAL

## 2019-04-02 ENCOUNTER — APPOINTMENT (OUTPATIENT)
Dept: CT IMAGING | Age: 79
End: 2019-04-02
Payer: COMMERCIAL

## 2019-04-02 VITALS
SYSTOLIC BLOOD PRESSURE: 182 MMHG | RESPIRATION RATE: 16 BRPM | OXYGEN SATURATION: 96 % | TEMPERATURE: 98.1 F | BODY MASS INDEX: 23.9 KG/M2 | DIASTOLIC BLOOD PRESSURE: 78 MMHG | HEART RATE: 74 BPM | HEIGHT: 64 IN | WEIGHT: 140 LBS

## 2019-04-02 DIAGNOSIS — R51.9 NONINTRACTABLE HEADACHE, UNSPECIFIED CHRONICITY PATTERN, UNSPECIFIED HEADACHE TYPE: Primary | ICD-10-CM

## 2019-04-02 LAB
ALBUMIN SERPL-MCNC: 3.7 G/DL (ref 3.2–4.6)
ALBUMIN/GLOB SERPL: 0.9 {RATIO} (ref 1.2–3.5)
ALP SERPL-CCNC: 128 U/L (ref 50–136)
ALT SERPL-CCNC: 64 U/L (ref 12–65)
ANION GAP SERPL CALC-SCNC: 9 MMOL/L (ref 7–16)
AST SERPL-CCNC: 93 U/L (ref 15–37)
BASOPHILS # BLD: 0 K/UL (ref 0–0.2)
BASOPHILS NFR BLD: 1 % (ref 0–2)
BILIRUB SERPL-MCNC: 0.3 MG/DL (ref 0.2–1.1)
BUN SERPL-MCNC: 13 MG/DL (ref 8–23)
CALCIUM SERPL-MCNC: 9.2 MG/DL (ref 8.3–10.4)
CHLORIDE SERPL-SCNC: 98 MMOL/L (ref 98–107)
CO2 SERPL-SCNC: 28 MMOL/L (ref 21–32)
CREAT SERPL-MCNC: 0.83 MG/DL (ref 0.6–1)
DIFFERENTIAL METHOD BLD: ABNORMAL
EOSINOPHIL # BLD: 0.1 K/UL (ref 0–0.8)
EOSINOPHIL NFR BLD: 3 % (ref 0.5–7.8)
ERYTHROCYTE [DISTWIDTH] IN BLOOD BY AUTOMATED COUNT: 12.1 % (ref 11.9–14.6)
GLOBULIN SER CALC-MCNC: 4.2 G/DL (ref 2.3–3.5)
GLUCOSE BLD STRIP.AUTO-MCNC: 287 MG/DL (ref 65–100)
GLUCOSE BLD STRIP.AUTO-MCNC: 429 MG/DL (ref 65–100)
GLUCOSE SERPL-MCNC: 447 MG/DL (ref 65–100)
HCT VFR BLD AUTO: 38.5 % (ref 35.8–46.3)
HGB BLD-MCNC: 13.5 G/DL (ref 11.7–15.4)
IMM GRANULOCYTES # BLD AUTO: 0 K/UL (ref 0–0.5)
IMM GRANULOCYTES NFR BLD AUTO: 0 % (ref 0–5)
LACTATE BLD-SCNC: 2.04 MMOL/L (ref 0.5–1.9)
LACTATE BLD-SCNC: 2.35 MMOL/L (ref 0.5–1.9)
LYMPHOCYTES # BLD: 1.3 K/UL (ref 0.5–4.6)
LYMPHOCYTES NFR BLD: 37 % (ref 13–44)
MCH RBC QN AUTO: 31.5 PG (ref 26.1–32.9)
MCHC RBC AUTO-ENTMCNC: 35.1 G/DL (ref 31.4–35)
MCV RBC AUTO: 89.7 FL (ref 79.6–97.8)
MONOCYTES # BLD: 0.3 K/UL (ref 0.1–1.3)
MONOCYTES NFR BLD: 9 % (ref 4–12)
NEUTS SEG # BLD: 1.8 K/UL (ref 1.7–8.2)
NEUTS SEG NFR BLD: 49 % (ref 43–78)
NRBC # BLD: 0 K/UL (ref 0–0.2)
PLATELET # BLD AUTO: 224 K/UL (ref 150–450)
PMV BLD AUTO: 12.1 FL (ref 9.4–12.3)
POTASSIUM SERPL-SCNC: 4.6 MMOL/L (ref 3.5–5.1)
PROT SERPL-MCNC: 7.9 G/DL (ref 6.3–8.2)
RBC # BLD AUTO: 4.29 M/UL (ref 4.05–5.2)
SODIUM SERPL-SCNC: 135 MMOL/L (ref 136–145)
WBC # BLD AUTO: 3.6 K/UL (ref 4.3–11.1)

## 2019-04-02 PROCEDURE — 96361 HYDRATE IV INFUSION ADD-ON: CPT

## 2019-04-02 PROCEDURE — 99285 EMERGENCY DEPT VISIT HI MDM: CPT

## 2019-04-02 PROCEDURE — 83605 ASSAY OF LACTIC ACID: CPT

## 2019-04-02 PROCEDURE — 96376 TX/PRO/DX INJ SAME DRUG ADON: CPT

## 2019-04-02 PROCEDURE — 74011250636 HC RX REV CODE- 250/636

## 2019-04-02 PROCEDURE — 74011636637 HC RX REV CODE- 636/637

## 2019-04-02 PROCEDURE — 70450 CT HEAD/BRAIN W/O DYE: CPT

## 2019-04-02 PROCEDURE — 85025 COMPLETE CBC W/AUTO DIFF WBC: CPT

## 2019-04-02 PROCEDURE — 80053 COMPREHEN METABOLIC PANEL: CPT

## 2019-04-02 PROCEDURE — 96374 THER/PROPH/DIAG INJ IV PUSH: CPT

## 2019-04-02 PROCEDURE — 82962 GLUCOSE BLOOD TEST: CPT

## 2019-04-02 PROCEDURE — 96375 TX/PRO/DX INJ NEW DRUG ADDON: CPT

## 2019-04-02 RX ORDER — ONDANSETRON 2 MG/ML
4 INJECTION INTRAMUSCULAR; INTRAVENOUS
Status: COMPLETED | OUTPATIENT
Start: 2019-04-02 | End: 2019-04-02

## 2019-04-02 RX ORDER — TRAMADOL HYDROCHLORIDE 50 MG/1
50 TABLET ORAL
Qty: 14 TAB | Refills: 0 | Status: SHIPPED | OUTPATIENT
Start: 2019-04-02 | End: 2019-04-05

## 2019-04-02 RX ORDER — SODIUM CHLORIDE 9 MG/ML
1000 INJECTION, SOLUTION INTRAVENOUS ONCE
Status: COMPLETED | OUTPATIENT
Start: 2019-04-02 | End: 2019-04-02

## 2019-04-02 RX ORDER — HYDROMORPHONE HYDROCHLORIDE 1 MG/ML
1 INJECTION, SOLUTION INTRAMUSCULAR; INTRAVENOUS; SUBCUTANEOUS
Status: COMPLETED | OUTPATIENT
Start: 2019-04-02 | End: 2019-04-02

## 2019-04-02 RX ORDER — LORAZEPAM 2 MG/ML
1 INJECTION INTRAMUSCULAR
Status: COMPLETED | OUTPATIENT
Start: 2019-04-02 | End: 2019-04-02

## 2019-04-02 RX ORDER — ONDANSETRON HYDROCHLORIDE 8 MG/1
8 TABLET, FILM COATED ORAL
Qty: 12 TAB | Refills: 0 | Status: SHIPPED | OUTPATIENT
Start: 2019-04-02

## 2019-04-02 RX ADMIN — HUMAN INSULIN 4 UNITS: 100 INJECTION, SOLUTION SUBCUTANEOUS at 03:19

## 2019-04-02 RX ADMIN — SODIUM CHLORIDE 1000 ML: 900 INJECTION, SOLUTION INTRAVENOUS at 03:19

## 2019-04-02 RX ADMIN — HYDROMORPHONE HYDROCHLORIDE 1 MG: 1 INJECTION, SOLUTION INTRAMUSCULAR; INTRAVENOUS; SUBCUTANEOUS at 03:29

## 2019-04-02 RX ADMIN — ONDANSETRON 4 MG: 2 INJECTION INTRAMUSCULAR; INTRAVENOUS at 03:29

## 2019-04-02 RX ADMIN — SODIUM CHLORIDE 1000 ML: 900 INJECTION, SOLUTION INTRAVENOUS at 01:38

## 2019-04-02 RX ADMIN — ONDANSETRON 4 MG: 2 INJECTION INTRAMUSCULAR; INTRAVENOUS at 04:49

## 2019-04-02 RX ADMIN — LORAZEPAM 1 MG: 2 INJECTION INTRAMUSCULAR; INTRAVENOUS at 04:49

## 2019-04-02 NOTE — DISCHARGE INSTRUCTIONS
Patient Education        Dolor de Anushka Solorzanoeler: Instrucciones de cuidado - [ Headache: Care Instructions ]  Instrucciones de cuidado    Los eloisa de serjio tienen muchas causas posibles. La mayoría de los eloisa de serjio no son señal de un problema más keith y mejoran por sí solos. El tratamiento en el hogar podría ayudarlo a sentirse mejor con Donnal Aki. El médico lo ha revisado minuciosamente, shaka puede desarrollar problemas más tarde. Si nota algún problema o síntomas, busque tratamiento médico inmediatamente. La atención de seguimiento es zaida parte clave de tong tratamiento y seguridad. Asegúrese de hacer y acudir a todas las citas, y llame a tong médico si está teniendo problemas. También es zaida buena idea saber los resultados de mau exámenes y mantener zaida lista de los medicamentos que patti. ¿Cómo puede cuidarse en el hogar? · No conduzca si ha tomado analgésicos (medicamentos para el dolor) recetados. · Descanse en un cuarto tranquilo y oscuro hasta que desaparezca el dolor de Anushka Mejias. Cierre los ojos y trate de relajarse o dormirse. No felicita la televisión ni jose. · Colóquese un paño frío y húmedo o Duayne Trupti compresa fría sobre la cecy adolorida de [address] a [address] minutos cada vez. Póngase un paño jerez entre la compresa fría y la piel. · Utilice zaida toalla húmeda tibia o zaida almohadilla térmica ajustada a baja temperatura para relajar los músculos tensos del theo y los hombros.  · Pídale a alguien que le alli masajes suaves en el theo y los hombros.  · [place] los analgésicos exactamente arley le fueron indicados. ? Si el médico le recetó un analgésico, tómelo según las indicaciones. ? Si no está tomando un analgésico recetado, pregúntele a tong médico si puede dilan amanda de [employer]. · Tenga cuidado de no dilan analgésicos con mayor frecuencia que la permitida en las indicaciones porque los eloisa de serjio podrían empeorar o aparecer con mayor frecuencia zaida vez que el medicamento pierda tong [place].   · Preste atención a los [employer] que aparecen con el dolor de [place], [place], debilidad o entumecimiento, cambios en la visión o confusión. Podrían ser señales de un problema más grave. Para prevenir los eloisa de serjio  · [employer] un diario de mau eloisa de serjio para que pueda averiguar qué los desencadena. Evitar los desencadenantes podría ayudar a prevenir los eloisa de [place]. Anote cuándo empieza cada dolor de [place], cuánto dura y cómo es el dolor (palpitante, jamie, punzante o sordo). Anote cualquier otro síntoma que haya tenido con el dolor de [place], [place] náuseas, destellos de stephany o RACHEL, o sensibilidad a la stephany brillante o a los ruidos bibi. Anote si el dolor de serjio ocurrió cerca de tong menstruación. Enumere todos los factores que pudieran michael desencadenado el dolor de [place], arley ciertos alimentos (chocolate, queso, vino) u olores, humo, luces brillantes, estrés o falta de sueño. · Encuentre maneras saludables de [facility]. Los eloisa de [place] son más comunes willard o homer después de un momento estresante. Tómese un tiempo para relajarse antes y después de hacer algo que le haya causado un dolor de serjio en el pasado. · Trate de mantener mau músculos relajados mediante zaida buena postura. Revise si tiene [employer] de la Christi, la lion, el theo y los hombros y trate de relajarlos. Cuando se siente en un escritorio, cambie de posición con frecuencia y estírese por 27 segundos cada hora. · Therese suficiente ejercicio y duerma bastante. · Coma en forma regular y carlin. Largos períodos sin comer pueden provocar un dolor de serjio. · Regálese un masaje. Algunas personas encuentran que los masajes hechos con regularidad son Garrel Junk para aliviar la tensión. · Limite la cafeína. No yesika demasiado café, té ni sodas. Kathe no deje de consumir cafeína de repente, porque eso también puede provocarle eloisa de [place].   · Reduzca la tensión en los ojos a causa de la computadora parpadeando con frecuencia y apartando la mirada de la pantalla a menudo. Asegúrese de tener lentes adecuados y de que tong monitor esté colocado de manera correcta, arley a un brazo de distancia. · Busque ayuda si tiene depresión o ansiedad. Nenita eloisa de [place] podrían relacionarse con estas afecciones. El tratamiento puede prevenir los eloisa de [place] y ayudar con los síntomas de ansiedad o depresión. ¿Cuándo debe pedir ayuda? Llame al 911 en cualquier momento que piense que puede necesitar atención de emergencia. Por ejemplo, llame si:    · Tiene señales de un ataque cerebral. Estas pueden incluir:  ? Parálisis, entumecimiento o debilidad repentinos en la lion, el brazo o la pierna, sobre todo si ocurre en un solo lado del cuerpo. ? Cambios repentinos en la visión. ? Dificultades repentinas para hablar. ? Confusión repentina o dificultad para comprender frases sencillas. ? Problemas repentinos para caminar o mantener el equilibrio. ? Dolor de [place] intenso y repentino, distinto de los eloisa de serjio anteriores.    Llame a tong médico ahora mismo o busque atención médica inmediata si:    · Tiene un dolor de Doyce Spillers o peor.     · Tong dolor de serjio empeora mucho. ¿Dónde puede encontrar más información en inglés? Juana Imam a http://anil-arnol.info/. Escriba [id number] en la búsqueda para aprender más acerca de \"Dolor de serjio: Instrucciones de cuidado - [ Headache: Care Instructions ]. \"  Revisado: 3 junio, 2018  Versión del contenido: 11.9  © 5887-0044 [employer], [employer]. Las instrucciones de cuidado fueron adaptadas bajo licencia por [employer] (which disclaims liability or warranty for this information). Si usted tiene Chelan Kil[place] afección médica o sobre estas instrucciones, siempre pregunte a tong profesional de tiffany. [employer]wise[employer] Incorporated niega toda garantía o responsabilidad por tong uso de esta información.

## 2019-04-02 NOTE — PROGRESS NOTES
received a call to interpret over the phone for patient. [facility] directly by dialing \"[phone]\" and then \"[phone]\" on the blue phone or [employer] phone. 
  
[address]  available over the phone for all requests in the Emergency Room. 
  
 
 
Thank you, SANJANA Acharya / 
[facility] 
c: [phone] / [email or web address] [address] / Jason, [facility] 
[email or web address]

## 2019-04-02 NOTE — ED TRIAGE NOTES
Ems was called for headache and having nausea and vomiting. This started around 2000 and progressively got worse. Ems found pt to have bgl [phone]

## 2019-04-02 NOTE — ED PROVIDER NOTES
25-year-old female complain of a headache. Headache started around 8 PM last night gradually got worse to she called her daughter at midnight due to the pain. Daughter states that she does have headaches fairly often but today was somewhat worse. Patient's blood sugar was found to be greater than 400. The history is provided by the patient. Headache This is a new problem. The current episode started 6 to 12 hours ago. The problem occurs constantly. The problem has not changed since onset. The headache is aggravated by an unknown factor. The pain is at a severity of 10/10. Associated symptoms include nausea. Pertinent negatives include no dizziness and no visual change. She has tried nothing for the symptoms. Past Medical History:  
Diagnosis Date  Anxiety  Depression  Diabetes mellitus type 2, uncontrolled ([address]  History of bilateral cataract extraction  History of coronary artery disease  History of gallbladder disease  Hypercholesterolemia  Hyperlipidemia  Hypertension  Osteopenia  Wears dentures Past Surgical History:  
Procedure Laterality Date  HX CATARACT REMOVAL Bilateral   
 HX CHOLECYSTECTOMY Family History:  
Problem Relation Age of Onset  No Known Problems Mother  No Known Problems Father Social History Socioeconomic History  Marital status: SINGLE Spouse name: Not on file  Number of children: Not on file  Years of education: Not on file  Highest education level: Not on file Occupational History  Not on file Social Needs  Financial resource strain: Not on file  Food insecurity:  
  Worry: Not on file Inability: Not on file  Transportation needs:  
  Medical: Not on file Non-medical: Not on file Tobacco Use  Smoking status: Former Smoker Packs/day: 0.25 Years: 15.00 Pack years: 3.75 Last attempt to quit: 1996 Years since quittin.7  Smokeless tobacco: Never Used Substance and Sexual Activity  Alcohol use: Yes Comment: social  
 Drug use: No  
 Sexual activity: Not on file Lifestyle  Physical activity:  
  Days per week: Not on file Minutes per session: Not on file  Stress: Not on file Relationships  Social connections:  
  Talks on phone: Not on file Gets together: Not on file Attends Tenriism service: Not on file Active member of club or organization: Not on file Attends meetings of clubs or organizations: Not on file Relationship status: Not on file  Intimate partner violence:  
  Fear of current or ex partner: Not on file Emotionally abused: Not on file Physically abused: Not on file Forced sexual activity: Not on file Other Topics Concern  Not on file Social History Narrative  Not on file ALLERGIES: Patient has no known allergies. Review of Systems Constitutional: Negative. Negative for activity change. HENT: Negative. Eyes: Negative. Respiratory: Negative. Cardiovascular: Negative. Gastrointestinal: Positive for nausea. Genitourinary: Negative. Musculoskeletal: Negative. Skin: Negative. Neurological: Positive for headaches. Negative for dizziness. Psychiatric/Behavioral: Negative. All other systems reviewed and are negative. Vitals:  
 04/02/19 0128 04/02/19 0139 04/02/19 0159 BP: (!) 208/84 194/85 Pulse: 92 92 Resp: 18 Temp: 98 °F (36.7 °C) SpO2: 96% 93% 93% Weight: 63.5 kg (140 lb) Height: 5' 4\" (1.626 m) Physical Exam  
Constitutional: She is oriented to person, place, and time. She appears well-developed and well-nourished. No distress. HENT:  
Head: Normocephalic and atraumatic. Right Ear: External ear normal.  
Left Ear: External ear normal.  
Nose: Nose normal.  
Mouth/Throat: Oropharynx is clear and moist. No oropharyngeal exudate. Eyes: Pupils are equal, round, and reactive to light. Conjunctivae and EOM are normal. Right eye exhibits no discharge. Left eye exhibits no discharge. No scleral icterus. Neck: Normal range of motion. Neck supple. No JVD present. No tracheal deviation present. Cardiovascular: Normal rate, regular rhythm and intact distal pulses. Pulmonary/Chest: Effort normal and breath sounds normal. No stridor. No respiratory distress. She has no wheezes. She exhibits no tenderness. Abdominal: Soft. Bowel sounds are normal. She exhibits no distension and no mass. There is no tenderness. Musculoskeletal: Normal range of motion. She exhibits no edema or tenderness. Neurological: She is alert and oriented to person, place, and time. No cranial nerve deficit. Skin: Skin is warm and dry. No rash noted. She is not diaphoretic. No erythema. No pallor. Psychiatric: She has a normal mood and affect. Her behavior is normal. Thought content normal.  
Nursing note and vitals reviewed. MDM Number of Diagnoses or Management Options Nonintractable headache, unspecified chronicity pattern, unspecified headache type:  
Diagnosis management comments: Patient had elevated blood sugar. She also had hypertension in the ED. CT head was normal laboratory data also normal patient did not give us a urine. Patient got pain relief and slept for some time. She one episode of vomiting when she went to the bathroom. The daughter thinks that she is having a panic attack and is over it now. Daughter wanted to take her home despite not having urine tested. She is to follow with primary care physician or return if any problems. Amount and/or Complexity of Data Reviewed Clinical lab tests: ordered and reviewed Tests in the radiology section of CPT®: reviewed and ordered Tests in the medicine section of CPT®: ordered and reviewed Risk of Complications, Morbidity, and/or Mortality Presenting problems: high Diagnostic procedures: high Management options: high Procedures

## 2019-04-02 NOTE — ED TRIAGE NOTES
Pt does not have a meter to check her sugars and daughter states she has told her mother to not use the insulin because she has not checked her sugars Ems gave 4mg IV

## 2019-04-02 NOTE — ED NOTES
I have reviewed discharge instructions with the patient. The patient verbalized understanding. Patient left ED via Discharge Method: wheelchair to Home with family. Opportunity for questions and clarification provided. Patient given 2 scripts. To continue your aftercare when you leave the hospital, you may receive an automated call from our care team to check in on how you are doing. This is a free service and part of our promise to provide the best care and service to meet your aftercare needs.  If you have questions, or wish to unsubscribe from this service please call [phone]. Thank you for Choosing our [facility] Emergency Department.

## 2019-04-24 ENCOUNTER — HOSPITAL ENCOUNTER (EMERGENCY)
Age: 79
Discharge: HOME | End: 2019-04-24
Attending: EMERGENCY MEDICINE
Payer: COMMERCIAL

## 2019-04-24 ENCOUNTER — APPOINTMENT (OUTPATIENT)
Dept: GENERAL RADIOLOGY | Age: 79
End: 2019-04-24
Attending: EMERGENCY MEDICINE
Payer: COMMERCIAL

## 2019-04-24 VITALS
WEIGHT: 130 LBS | TEMPERATURE: 98.5 F | SYSTOLIC BLOOD PRESSURE: 105 MMHG | RESPIRATION RATE: 17 BRPM | DIASTOLIC BLOOD PRESSURE: 75 MMHG | OXYGEN SATURATION: 99 % | HEART RATE: 77 BPM | BODY MASS INDEX: 22.2 KG/M2 | HEIGHT: 64 IN

## 2019-04-24 DIAGNOSIS — M25.571 CHRONIC PAIN OF BOTH ANKLES: ICD-10-CM
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
## 2019-04-24 DIAGNOSIS — G89.29 CHRONIC PAIN OF BOTH ANKLES: ICD-10-CM

## 2019-04-24 DIAGNOSIS — M25.562 ACUTE PAIN OF LEFT KNEE: ICD-10-CM

## 2019-04-24 DIAGNOSIS — L03.116 CELLULITIS OF LEFT LOWER EXTREMITY: ICD-10-CM

## 2019-04-24 DIAGNOSIS — M25.572 CHRONIC PAIN OF BOTH ANKLES: ICD-10-CM

## 2019-04-24 PROCEDURE — 73610 X-RAY EXAM OF ANKLE: CPT

## 2019-04-24 PROCEDURE — 99283 EMERGENCY DEPT VISIT LOW MDM: CPT | Performed by: NURSE PRACTITIONER

## 2019-04-24 PROCEDURE — 73562 X-RAY EXAM OF KNEE 3: CPT

## 2019-04-24 RX ORDER — CEPHALEXIN 500 MG/1
500 CAPSULE ORAL 4 TIMES DAILY
Qty: 28 CAP | Refills: 0 | Status: SHIPPED | OUTPATIENT
Start: 2019-04-24 | End: 2019-05-01

## 2019-04-24 NOTE — ED TRIAGE NOTES
Pt states a couple of days ago she fell and now has swelling to top of left foot. Also c/o left knee pain. Sadie Hinduism while walking dog. Denies hitting head or LOC.

## 2019-04-24 NOTE — DISCHARGE INSTRUCTIONS
Over the counter tylenol for pain. Follow up with your primary care provider for a recheck if symptoms fail to improve  Return to the Emergency Department for any new or worse symptoms.

## 2019-04-24 NOTE — ED NOTES
I have reviewed discharge instructions with the patient. The patient verbalized understanding. Patient left ED via Discharge Method: ambulatory to Home with (insert name of family/friend, self, transport Daughter). Opportunity for questions and clarification provided. Patient given 1 scripts. To continue your aftercare when you leave the hospital, you may receive an automated call from our care team to check in on how you are doing. This is a free service and part of our promise to provide the best care and service to meet your aftercare needs.  If you have questions, or wish to unsubscribe from this service please call [phone]. Thank you for Choosing our [facility] [employer] [facility] Emergency Department.

## 2019-04-24 NOTE — ED PROVIDER NOTES
Patient states a week ago she fell while walking her dog. She states pain to bilateral feet and left knee. Patient noted to have a sore to the top of her left foot. She states area is tender and has been swelling. The history is provided by the patient. The history is limited by a language barrier. A  was used. Past Medical History:  
Diagnosis Date  Anxiety  Depression  Diabetes mellitus type 2, uncontrolled ([address]  History of bilateral cataract extraction  History of coronary artery disease  History of gallbladder disease  Hypercholesterolemia  Hyperlipidemia  Hypertension  Osteopenia  Wears dentures Past Surgical History:  
Procedure Laterality Date  HX CATARACT REMOVAL Bilateral   
 HX CHOLECYSTECTOMY Family History:  
Problem Relation Age of Onset  No Known Problems Mother  No Known Problems Father Social History Socioeconomic History  Marital status: SINGLE Spouse name: Not on file  Number of children: Not on file  Years of education: Not on file  Highest education level: Not on file Occupational History  Not on file Social Needs  Financial resource strain: Not on file  Food insecurity:  
  Worry: Not on file Inability: Not on file  Transportation needs:  
  Medical: Not on file Non-medical: Not on file Tobacco Use  Smoking status: Former Smoker Packs/day: 0.25 Years: 15.00 Pack years: 3.75 Last attempt to quit: 1996 Years since quittin.8  Smokeless tobacco: Never Used Substance and Sexual Activity  Alcohol use: Yes Comment: social  
 Drug use: No  
 Sexual activity: Not on file Lifestyle  Physical activity:  
  Days per week: Not on file Minutes per session: Not on file  Stress: Not on file Relationships  Social connections:  
  Talks on phone: Not on file Gets together: Not on file Attends Yazidi service: Not on file Active member of club or organization: Not on file Attends meetings of clubs or organizations: Not on file Relationship status: Not on file  Intimate partner violence:  
  Fear of current or ex partner: Not on file Emotionally abused: Not on file Physically abused: Not on file Forced sexual activity: Not on file Other Topics Concern  Not on file Social History Narrative  Not on file ALLERGIES: Patient has no known allergies. Review of Systems Constitutional: Negative for fever. Musculoskeletal: Positive for arthralgias and joint swelling. Skin: Positive for color change and wound. Vitals:  
 04/24/19 1151 04/24/19 1418 BP: 102/75 105/75 Pulse: 78 77 Resp: 18 17 Temp: 98.6 °F (37 °C) 98.5 °F (36.9 °C) SpO2: 98% 99% Weight: 59 kg (130 lb) Height: 5' 4\" (1.626 m) Physical Exam  
Constitutional: She is oriented to person, place, and time. No distress. HENT:  
Head: Normocephalic and atraumatic. Eyes: Conjunctivae and EOM are normal.  
Neck: Normal range of motion. Neck supple. Cardiovascular: Normal rate and regular rhythm. Pulmonary/Chest: Effort normal and breath sounds normal.  
Abdominal: Soft. Bowel sounds are normal.  
Musculoskeletal:  
     Left knee: She exhibits bony tenderness. She exhibits no swelling. Right foot: There is tenderness. There is no swelling. Left foot: There is tenderness and swelling. Feet: 
 
Neurological: She is alert and oriented to person, place, and time. Skin: Skin is warm and dry. She is not diaphoretic. There is erythema. No pallor. Psychiatric: She has a normal mood and affect. Her behavior is normal.  
Nursing note and vitals reviewed. Xr Ankle Rt Min 3 V Result Date: 4/24/2019 Right ankle  4/24/2019 1:18 PM Indication: History of fall 2 days ago with bilateral ankle and left knee pain Comparison: None available at this hospital PACS Findings: AP, Lateral, Oblique views are submitted. Bone density is normal. There is no fracture, lesion, or acute joint abnormality. There is no advanced degenerative changes or focal finding in the soft tissues. Impression: No acute fracture or joint finding. Left knee  4/24/2019 1:18 PM Comparison: None available at [facility] PACS Findings: AP, Lateral, Oblique views are submitted. Bone density is normal. There is no fracture, lesion, or acute joint abnormality. There is no advanced degenerative changes or focal finding in the soft tissues. Impression: Negative; no acute bony or joint findings. Left ankle  4/24/2019 1:18 PM Comparison: None available at [facility] PACS Findings: AP, Lateral, Oblique views are submitted. Bone density is normal. There is no fracture, lesion, or acute joint abnormality. There is no advanced degenerative changes or focal finding in the soft tissues. Impression: Negative; no acute bony or joint findings. Xr Ankle Lt Min 3 V Result Date: 4/24/2019 Right ankle  4/24/2019 1:18 PM Indication: History of fall 2 days ago with bilateral ankle and left knee pain Comparison: None available at this [facility] [place] PACS Findings: AP, Lateral, Oblique views are submitted. Bone density is normal. There is no fracture, lesion, or acute joint abnormality. There is no advanced degenerative changes or focal finding in the soft tissues. Impression: No acute fracture or joint finding. Left knee  4/24/2019 1:18 PM Comparison: None available at this hospital PACS Findings: AP, Lateral, Oblique views are submitted. Bone density is normal. There is no fracture, lesion, or acute joint abnormality. There is no advanced degenerative changes or focal finding in the soft tissues. Impression: Negative; no acute bony or joint findings.  Left ankle  4/24/2019 1:18 PM Comparison: None available at this hospital PACS Findings: AP, Lateral, Oblique views are submitted. Bone density is normal. There is no fracture, lesion, or acute joint abnormality. There is no advanced degenerative changes or focal finding in the soft tissues. Impression: Negative; no acute bony or joint findings. Ct Head Wo Cont Result Date: 4/2/2019 CT HEAD WITHOUT CONTRAST HISTORY:  Headache. COMPARISON: 7/6/2018 TECHNIQUE: Axial imaging was performed without intravenous contrast utilizing 5mm slice thickness. Sagittal and coronal reformats were performed. Radiation dose reduction techniques were used for this study. Our CT scanner uses one or all of the following: Automated exposure control, adjustment of the MAS or KUB according to patient's size and iterative reconstruction. FINDINGS:    *BRAIN:    -  There are no early signs of territorial or lacunar infarction by CT.    -  No intracranial mass, hematoma, or hydrocephalus. -  For patient's age, the scattered areas of white matter hypodensities may represent a chronic small vessel white matter ischemia. However this is nonspecific. *VISUALIZED PARANASAL SINUSES: Well aerated. *MASTOIDS:  Clear. *CALVARIUM AND SCALP: Unremarkable. IMPRESSION: No acute intracranial abnormalities. Date of Dictation: 4/2/2019 4:10 AM  
 
Xr Knee Lt 3 V Result Date: 4/24/2019 Right ankle  4/24/2019 1:18 PM Indication: History of fall 2 days ago with bilateral ankle and left knee pain Comparison: None available at this hospital PACS Findings: AP, Lateral, Oblique views are submitted. Bone density is normal. There is no fracture, lesion, or acute joint abnormality. There is no advanced degenerative changes or focal finding in the soft tissues. Impression: No acute fracture or joint finding. Left knee  4/24/2019 1:18 PM Comparison: None available at this hospital PACS Findings: AP, Lateral, Oblique views are submitted.  Bone density is normal. There is no fracture, lesion, or acute joint abnormality. There is no advanced degenerative changes or focal finding in the soft tissues. Impression: Negative; no acute bony or joint findings. Left ankle  4/24/2019 1:18 PM Comparison: None available at this hospital PACS Findings: AP, Lateral, Oblique views are submitted. Bone density is normal. There is no fracture, lesion, or acute joint abnormality. There is no advanced degenerative changes or focal finding in the soft tissues. Impression: Negative; no acute bony or joint findings. MDM Number of Diagnoses or Management Options Acute pain of left knee:  
Cellulitis of left lower extremity:  
Chronic pain of both ankles:  
Fall, initial encounter:  
Diagnosis management comments: Xray negative for acute changes. prescription for keflex given. Amount and/or Complexity of Data Reviewed Tests in the radiology section of CPT®: ordered and reviewed Risk of Complications, Morbidity, and/or Mortality Presenting problems: low Diagnostic procedures: low Management options: low Patient Progress Patient progress: stable Procedures

## 2020-02-03 ENCOUNTER — APPOINTMENT (OUTPATIENT)
Dept: GENERAL RADIOLOGY | Age: 80
End: 2020-02-03
Attending: EMERGENCY MEDICINE
Payer: MEDICARE

## 2020-02-03 ENCOUNTER — HOSPITAL ENCOUNTER (EMERGENCY)
Age: 80
Discharge: HOME | End: 2020-02-03
Attending: EMERGENCY MEDICINE
Payer: MEDICARE

## 2020-02-03 VITALS
BODY MASS INDEX: 23.92 KG/M2 | TEMPERATURE: 99.4 F | DIASTOLIC BLOOD PRESSURE: 63 MMHG | WEIGHT: 130 LBS | HEIGHT: 62 IN | OXYGEN SATURATION: 90 % | HEART RATE: 73 BPM | RESPIRATION RATE: 18 BRPM | SYSTOLIC BLOOD PRESSURE: 140 MMHG

## 2020-02-03 DIAGNOSIS — M25.551 RIGHT HIP PAIN: ICD-10-CM

## 2020-02-03 DIAGNOSIS — M25.561 ACUTE PAIN OF RIGHT KNEE: ICD-10-CM
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
## 2020-02-03 LAB
ALBUMIN SERPL-MCNC: 3.6 G/DL (ref 3.2–4.6)
ALBUMIN/GLOB SERPL: 1 {RATIO} (ref 1.2–3.5)
ALP SERPL-CCNC: 86 U/L (ref 50–136)
ALT SERPL-CCNC: 30 U/L (ref 12–65)
ANION GAP SERPL CALC-SCNC: 7 MMOL/L (ref 7–16)
AST SERPL-CCNC: 24 U/L (ref 15–37)
BASOPHILS # BLD: 0 K/UL (ref 0–0.2)
BASOPHILS NFR BLD: 0 % (ref 0–2)
BILIRUB SERPL-MCNC: 0.5 MG/DL (ref 0.2–1.1)
BUN SERPL-MCNC: 17 MG/DL (ref 8–23)
CALCIUM SERPL-MCNC: 9.3 MG/DL (ref 8.3–10.4)
CHLORIDE SERPL-SCNC: 106 MMOL/L (ref 98–107)
CO2 SERPL-SCNC: 27 MMOL/L (ref 21–32)
CREAT SERPL-MCNC: 0.63 MG/DL (ref 0.6–1)
DIFFERENTIAL METHOD BLD: ABNORMAL
EOSINOPHIL # BLD: 0 K/UL (ref 0–0.8)
EOSINOPHIL NFR BLD: 1 % (ref 0.5–7.8)
ERYTHROCYTE [DISTWIDTH] IN BLOOD BY AUTOMATED COUNT: 12.3 % (ref 11.9–14.6)
GLOBULIN SER CALC-MCNC: 3.6 G/DL (ref 2.3–3.5)
GLUCOSE SERPL-MCNC: 222 MG/DL (ref 65–100)
HCT VFR BLD AUTO: 37.5 % (ref 35.8–46.3)
HGB BLD-MCNC: 12.5 G/DL (ref 11.7–15.4)
IMM GRANULOCYTES # BLD AUTO: 0 K/UL (ref 0–0.5)
IMM GRANULOCYTES NFR BLD AUTO: 0 % (ref 0–5)
LYMPHOCYTES # BLD: 1 K/UL (ref 0.5–4.6)
LYMPHOCYTES NFR BLD: 17 % (ref 13–44)
MCH RBC QN AUTO: 31.6 PG (ref 26.1–32.9)
MCHC RBC AUTO-ENTMCNC: 33.3 G/DL (ref 31.4–35)
MCV RBC AUTO: 94.7 FL (ref 79.6–97.8)
MONOCYTES # BLD: 0.5 K/UL (ref 0.1–1.3)
MONOCYTES NFR BLD: 9 % (ref 4–12)
NEUTS SEG # BLD: 4.4 K/UL (ref 1.7–8.2)
NEUTS SEG NFR BLD: 73 % (ref 43–78)
NRBC # BLD: 0 K/UL (ref 0–0.2)
PLATELET # BLD AUTO: 208 K/UL (ref 150–450)
PMV BLD AUTO: 11.4 FL (ref 9.4–12.3)
POTASSIUM SERPL-SCNC: 4.3 MMOL/L (ref 3.5–5.1)
PROT SERPL-MCNC: 7.2 G/DL (ref 6.3–8.2)
RBC # BLD AUTO: 3.96 M/UL (ref 4.05–5.2)
SODIUM SERPL-SCNC: 140 MMOL/L (ref 136–145)
WBC # BLD AUTO: 6 K/UL (ref 4.3–11.1)

## 2020-02-03 PROCEDURE — 96375 TX/PRO/DX INJ NEW DRUG ADDON: CPT

## 2020-02-03 PROCEDURE — 74011250636 HC RX REV CODE- 250/636: Performed by: EMERGENCY MEDICINE

## 2020-02-03 PROCEDURE — 99284 EMERGENCY DEPT VISIT MOD MDM: CPT

## 2020-02-03 PROCEDURE — 73552 X-RAY EXAM OF FEMUR 2/>: CPT

## 2020-02-03 PROCEDURE — 71045 X-RAY EXAM CHEST 1 VIEW: CPT

## 2020-02-03 PROCEDURE — 73562 X-RAY EXAM OF KNEE 3: CPT

## 2020-02-03 PROCEDURE — 80053 COMPREHEN METABOLIC PANEL: CPT

## 2020-02-03 PROCEDURE — 73502 X-RAY EXAM HIP UNI 2-3 VIEWS: CPT

## 2020-02-03 PROCEDURE — 96374 THER/PROPH/DIAG INJ IV PUSH: CPT

## 2020-02-03 PROCEDURE — 85025 COMPLETE CBC W/AUTO DIFF WBC: CPT

## 2020-02-03 RX ORDER — HYDROMORPHONE HYDROCHLORIDE 1 MG/ML
0.5 INJECTION, SOLUTION INTRAMUSCULAR; INTRAVENOUS; SUBCUTANEOUS
Status: COMPLETED | OUTPATIENT
Start: 2020-02-03 | End: 2020-02-03

## 2020-02-03 RX ORDER — ONDANSETRON 2 MG/ML
4 INJECTION INTRAMUSCULAR; INTRAVENOUS
Status: COMPLETED | OUTPATIENT
Start: 2020-02-03 | End: 2020-02-03

## 2020-02-03 RX ADMIN — HYDROMORPHONE HYDROCHLORIDE 0.5 MG: 1 INJECTION, SOLUTION INTRAMUSCULAR; INTRAVENOUS; SUBCUTANEOUS at 09:20

## 2020-02-03 RX ADMIN — SODIUM CHLORIDE 1000 ML: 900 INJECTION, SOLUTION INTRAVENOUS at 09:16

## 2020-02-03 RX ADMIN — ONDANSETRON 4 MG: 2 INJECTION INTRAMUSCULAR; INTRAVENOUS at 09:20

## 2020-02-03 NOTE — ED TRIAGE NOTES
Pt brought in by EMS from home after a fall, tripped over something, pt c/o right knee to hip pain. Pt denies and numbness and tingling with the leg. Pt c/o lower abd pain that started 3 days ago, denies n/v but states having diarrhea. 148/70, 82hr, bgl 66 with ems.  99.0 oral.

## 2020-02-03 NOTE — LETTER
[address] [facility] EMERGENCY DEPT 
[address] [facility] 
[phone] Work/School Note Date: 2/3/2020 To Whom It May concern: 
 
Tomasz Betancur was seen and treated today in the emergency room by the following provider(s): 
Attending Provider: Lucius Bamberger, MD. Dano Leos was with patient today, may return to school on 2/4/20. Sincerely, Fransico Lopez

## 2020-02-03 NOTE — ED PROVIDER NOTES
[facility] Emergency Department  Arrival Date/Time: 2/3/2020 @  9:02 AM      Yajaira Andrade  MRN: [id number]      78 y.o. female    YOB: 1940   [phone] (home)     [facility] EMERGENCY DEPT ERA/ A  Seen on 2/3/2020 @ 9:14 AM      Today's Chief Complaint:   Chief Complaint   Patient presents with   Dorethaalfonso [place]     HPI: 78-year-old female presents to the emergency department by ambulance. Family called secondary to right leg pain. Patient tripped over a tree root yesterday and fell. She is complaining of pain in the right hip the right knee. Increased with movement. Slightly better with rest    The right leg is held slightly flexed. No shortening or rotation is appreciated on exam    She is awake alert oriented. She is able to answer questions and follow commands. HPI    Review of Systems: Review of Systems   Constitutional: Negative for chills and fever. HENT: Negative. Respiratory: Negative. Cardiovascular: Negative. Gastrointestinal: Negative. Musculoskeletal: Negative for neck pain. Skin: Negative. Neurological: Negative. Psychiatric/Behavioral: Negative. Past Medical History: Primary Care Doctor: Shanice Driver MD  Meds, PMH, PSHx, SocHx at end of this note     Allergies: No Known Allergies      Key Anti-Platelet Anticoagulant Meds             aspirin 81 mg chewable tablet Take 1 Tab by mouth daily. Physical Exam:  Nursing documentation reviewed. Patient Vitals for the past 24 hrs:   Temp Pulse Resp BP SpO2   02/03/20 1003  69  145/65 95 %   02/03/20 0919  80  130/61 94 %   02/03/20 0906 99.4 °F (37.4 °C) 79 18 138/66 95 %   02/03/20 0905  79  138/66 94 %     Vital signs were reviewed. Physical Exam  Vitals signs and nursing note reviewed. Constitutional:       Appearance: Normal appearance. She is not ill-appearing. HENT:      Head: Normocephalic and atraumatic. Mouth/Throat:      Mouth: Mucous membranes are dry. Eyes:      Extraocular Movements: Extraocular movements intact. Pupils: Pupils are equal, round, and reactive to light. Cardiovascular:      Rate and Rhythm: Normal rate and regular rhythm. Pulmonary:      Effort: No respiratory distress. Breath sounds: Normal breath sounds. Abdominal:      General: There is no distension. Tenderness: There is no abdominal tenderness. Skin:     General: Skin is warm. Capillary Refill: Capillary refill takes less than 2 seconds. Neurological:      Mental Status: She is alert and oriented to person, place, and time. Psychiatric:         Mood and Affect: Mood normal.         Behavior: Behavior normal.         MEDICAL DECISION MAKING:   Differential Diagnosis:    MDM  Number of Diagnoses or Management Options  Diagnosis management comments: Nontoxic-appearing 59-year-old female with right hip and knee pain after a fall yesterday. She is awake alert oriented answering questions following commands. No head injury or confusion. Concern about possible hip fracture. Will obtain radiographs.        Amount and/or Complexity of Data Reviewed  Clinical lab tests: ordered  Tests in the radiology section of CPT®: ordered  Obtain history from someone other than the patient: yes    Risk of Complications, Morbidity, and/or Mortality  Presenting problems: moderate  Diagnostic procedures: minimal  Management options: high          Data/Management:    Lab findings during this visit:   Recent Results (from the past 48 hour(s))   CBC WITH AUTOMATED DIFF    Collection Time: 02/03/20  9:13 AM   Result Value Ref Range    WBC 6.0 4.3 - 11.1 K/uL    RBC 3.96 (L) 4.05 - 5.2 M/uL    HGB 12.5 11.7 - 15.4 g/dL    HCT 37.5 35.8 - 46.3 %    MCV 94.7 79.6 - 97.8 FL    MCH 31.6 26.1 - 32.9 PG    MCHC 33.3 31.4 - 35.0 g/dL    RDW 12.3 11.9 - 14.6 %    PLATELET 244 204 - 000 K/uL    MPV 11.4 9.4 - 12.3 FL    ABSOLUTE NRBC 0.00 0.0 - 0.2 K/uL    DF AUTOMATED      NEUTROPHILS 73 43 - 78 %    LYMPHOCYTES 17 13 - 44 %    MONOCYTES 9 4.0 - 12.0 %    EOSINOPHILS 1 0.5 - 7.8 %    BASOPHILS 0 0.0 - 2.0 %    IMMATURE GRANULOCYTES 0 0.0 - 5.0 %    ABS. NEUTROPHILS 4.4 1.7 - 8.2 K/UL    ABS. LYMPHOCYTES 1.0 0.5 - 4.6 K/UL    ABS. MONOCYTES 0.5 0.1 - 1.3 K/UL    ABS. EOSINOPHILS 0.0 0.0 - 0.8 K/UL    ABS. BASOPHILS 0.0 0.0 - 0.2 K/UL    ABS. IMM. GRANS. 0.0 0.0 - 0.5 K/UL   METABOLIC PANEL, COMPREHENSIVE    Collection Time: 02/03/20  9:13 AM   Result Value Ref Range    Sodium 140 136 - 145 mmol/L    Potassium 4.3 3.5 - 5.1 mmol/L    Chloride 106 98 - 107 mmol/L    CO2 27 21 - 32 mmol/L    Anion gap 7 7 - 16 mmol/L    Glucose 222 (H) 65 - 100 mg/dL    BUN 17 8 - 23 MG/DL    Creatinine 0.63 0.6 - 1.0 MG/DL    GFR est AA >60 >60 ml/min/1.73m2    GFR est non-AA >60 >60 ml/min/1.73m2    Calcium 9.3 8.3 - 10.4 MG/DL    Bilirubin, total 0.5 0.2 - 1.1 MG/DL    ALT (SGPT) 30 12 - 65 U/L    AST (SGOT) 24 15 - 37 U/L    Alk. phosphatase 86 50 - 136 U/L    Protein, total 7.2 6.3 - 8.2 g/dL    Albumin 3.6 3.2 - 4.6 g/dL    Globulin 3.6 (H) 2.3 - 3.5 g/dL    A-G Ratio 1.0 (L) 1.2 - 3.5         Radiology studies during this visit: Xr Chest Sngl V    Result Date: 2/3/2020  IMPRESSION: 1. No evidence of hip or femur fracture. 2.  No acute findings in the chest.     Xr Hip Rt W Or Wo Pelv 2-3 Vws    Result Date: 2/3/2020  IMPRESSION: 1. No evidence of hip or femur fracture. 2.  No acute findings in the chest.     Xr Femur Rt 2 Vs    Result Date: 2/3/2020  IMPRESSION: 1. No evidence of hip or femur fracture.  2.  No acute findings in the chest.     Xr Knee Rt 3 V    Result Date: 2/3/2020  IMPRESSION: No acute findings       Medications given in the ED:   Medications   HYDROmorphone (PF) (DILAUDID) injection 0.5 mg (0.5 mg IntraVENous Given 2/3/20 0920)   ondansetron (ZOFRAN) injection 4 mg (4 mg IntraVENous Given 2/3/20 0920)   sodium chloride 0.9 % bolus infusion 1,000 mL (0 mL IntraVENous IV Completed 2/3/20 1007) Recheck and Additional Documentation:  (use .addrecheck  . addsepsis   . addstroke   . addhip  . addhandoff  . addcctime)     Patient resting quietly. On recheck exam she is able to elevate her right leg. Move it medially and laterally without pain. Pain in the knee is increased a little bit. Still has some pain with flexion and palpation. The knee is slightly swollen. X-rays are negative for fracture of the knee or hip. Procedure Documentation:   Procedures     Other ED Course Notes:        Assessment and Plan:    Impression:     ICD-10-CM ICD-9-CM   1. Fall, initial encounter [address] XXXA E888.9   2. Right hip pain M25.551 719.45   3. Acute pain of right knee M25.561 719.46     Disposition:    Follow-up:   Follow-up Information     Follow up With Specialties Details Why Contact Info    Altaf Phelan MD [facility]   [address] [place] [address]  [address] [place], [place] [phone]          DC Med:   Current Discharge Medication List          Past Medical History:      Past Medical History:   Diagnosis Date    Anxiety     Depression     Diabetes mellitus type 2, uncontrolled ([address]     History of bilateral cataract extraction     History of coronary artery disease     History of gallbladder disease     Hypercholesterolemia     Hyperlipidemia     Hypertension     Osteopenia     Wears dentures      Past Surgical History:   Procedure Laterality Date    HX CATARACT REMOVAL Bilateral     HX CHOLECYSTECTOMY       Social History     Tobacco Use    Smoking status: Former Smoker     Packs/day: 0.25     Years: 15.00     Pack years: 3.75     Last attempt to quit: 1996     Years since quittin.6    Smokeless tobacco: Never Used   Substance Use Topics    Alcohol use: Yes     Comment: social    Drug use: No     Prior to Admission Medications   Prescriptions Last Dose Informant Patient Reported? Taking?    Blood-Glucose Meter monitoring kit   No No   Sig: Check blood glucose twice daily Lancets misc   No No   Sig: Use as directed   aspirin 81 mg chewable tablet   No No   Sig: Take 1 Tab by mouth daily. atorvastatin (LIPITOR) 80 mg tablet   No No   Sig: Take 1 Tab by mouth daily for 90 days. escitalopram oxalate (LEXAPRO) 20 mg tablet   No No   Sig: Take 1 Tab by mouth daily. fenofibrate (LOFIBRA) 160 mg tablet   No No   Sig: Take 1 Tab by mouth daily. glipiZIDE-metFORMIN (METAGLIP) 2.5-500 mg per tablet   No No   Sig: Take 2 Tabs by mouth Before breakfast and dinner. Indications: type 2 diabetes mellitus   glucose blood VI test strips (BLOOD GLUCOSE TEST) strip   No No   Sig: Use Truetest strips to test blood sugars 4 times daily DM2 controlled, E11.65   glucose blood VI test strips (TRUETEST TEST STRIPS) strip   No No   Sig: Check blood glucose twice daily. ibuprofen (MOTRIN) 600 mg tablet   No No   Sig: Take 1 Tab by mouth every six (6) hours as needed for Pain. insulin detemir U-100 (LEVEMIR FLEXTOUCH U-100 INSULN) 100 unit/mL (3 mL) inpn   No No   Si units with evening meal   nitroglycerin (NITROSTAT) 0.4 mg SL tablet   No No   Si Tab by SubLINGual route every five (5) minutes as needed for Chest Pain. ondansetron hcl (ZOFRAN) 8 mg tablet   No No   Sig: Take 1 Tab by mouth every eight (8) hours as needed for Nausea. pioglitazone (ACTOS) 15 mg tablet   No No   Sig: Take 1 Tab by mouth daily.       Facility-Administered Medications: None

## 2020-02-03 NOTE — LETTER
[facility] EMERGENCY DEPT 
[address] [facility] [place] [address] 
[phone] Work/School Note Date: 2/3/2020 To Whom It May concern: 
 
Yajaira Andrade was seen and treated today in the emergency room by the following provider(s): 
Attending Provider: Tresa Dubose MD. Valentin Vaughan was with patient today,  may return to school on 2/4/20. Sincerely, Jane Siegel

## 2020-02-03 NOTE — LETTER
[facility] EMERGENCY DEPT 
[address] [facility] [place] [address] 
[phone] Work/School Note Date: 2/3/2020 To Whom It May concern: 
 
Vilma Cruz was seen and treated today in the emergency room by the following provider(s): 
Attending Provider: Gerardo Hercules MD. [employer] was with patient today, may return to school on 2/4/20 Sincerely, Vic Levy

## 2020-02-03 NOTE — ED NOTES
I have reviewed discharge instructions with the patient and caregiver. The patient and caregiver verbalized understanding. Patient left ED via Discharge Method: wheelchair to Home with daughter. Opportunity for questions and clarification provided. Patient given 0 scripts. No esign        To continue your aftercare when you leave the hospital, you may receive an automated call from our care team to check in on how you are doing. This is a free service and part of our promise to provide the best care and service to meet your aftercare needs.  If you have questions, or wish to unsubscribe from this service please call [phone]. Thank you for Choosing our [facility] Emergency Department.

## 2020-03-10 ENCOUNTER — HOSPITAL ENCOUNTER (EMERGENCY)
Age: 80
Discharge: HOME | End: 2020-03-10
Attending: EMERGENCY MEDICINE
Payer: MEDICARE

## 2020-03-10 VITALS
TEMPERATURE: 98 F | RESPIRATION RATE: 18 BRPM | DIASTOLIC BLOOD PRESSURE: 68 MMHG | HEART RATE: 70 BPM | OXYGEN SATURATION: 98 % | SYSTOLIC BLOOD PRESSURE: 132 MMHG

## 2020-03-10 DIAGNOSIS — M17.11 ARTHRITIS OF RIGHT KNEE: Primary | ICD-10-CM

## 2020-03-10 PROCEDURE — 74011000250 HC RX REV CODE- 250: Performed by: NURSE PRACTITIONER

## 2020-03-10 PROCEDURE — 99283 EMERGENCY DEPT VISIT LOW MDM: CPT

## 2020-03-10 RX ORDER — LIDOCAINE 4 G/100G
PATCH TOPICAL
Qty: 10 PATCH | Refills: 0 | Status: SHIPPED | OUTPATIENT
Start: 2020-03-10

## 2020-03-10 RX ORDER — DICLOFENAC SODIUM 10 MG/G
4 GEL TOPICAL 4 TIMES DAILY
Qty: 2 EACH | Refills: 0 | Status: SHIPPED | OUTPATIENT
Start: 2020-03-10 | End: 2020-03-17

## 2020-03-10 RX ORDER — LIDOCAINE 4 G/100G
1 PATCH TOPICAL
Status: DISCONTINUED | OUTPATIENT
Start: 2020-03-10 | End: 2020-03-10 | Stop reason: HOSPADM

## 2020-03-10 NOTE — PROGRESS NOTES
Hospital  present to assist in communication between pt and NP.      Toño Watson  CERTIFIED HEALTHCARE INTERPRETERTM ([facility] [place]  Language Services Department   [address]  [phone] (phone)

## 2020-03-10 NOTE — PROGRESS NOTES
present to cover any requests in the Emergency Department         Thank you for this referral,       Eve Shirley, [facility]  /Patient Relations    [place] [employer] [facility].  [address] [facility], [address] [place] [address]    [phone]

## 2020-03-10 NOTE — ED TRIAGE NOTES
Pt complains of continued pain to bilateral knees. States she was seen in the office for same. States she has treated with tylenol for about 3 months. Telephone interpretor used in triage.

## 2020-03-10 NOTE — ED PROVIDER NOTES
Need  assist.  Has been called. 2:10 PM   assist appreciated. Pt states pain right knee since fall several months ago. Tylenol not helping. Pain worse w weight bearing. Some snapping and popping. Feels unsteady as well. Some swelling. Also vague right shoulder pain. Records indicate chronic oa.   Had xrays at time of fall which showed no acute           Past Medical History:   Diagnosis Date    Anxiety     Depression     Diabetes mellitus type 2, uncontrolled (HCC)     History of bilateral cataract extraction     History of coronary artery disease     History of gallbladder disease     Hypercholesterolemia     Hyperlipidemia     Hypertension     Osteopenia     Wears dentures        Past Surgical History:   Procedure Laterality Date    HX CATARACT REMOVAL Bilateral     HX CHOLECYSTECTOMY           Family History:   Problem Relation Age of Onset    No Known Problems Mother     No Known Problems Father        Social History     Socioeconomic History    Marital status: SINGLE     Spouse name: Not on file    Number of children: Not on file    Years of education: Not on file    Highest education level: Not on file   Occupational History    Not on file   Social Needs    Financial resource strain: Not on file    Food insecurity:     Worry: Not on file     Inability: Not on file    Transportation needs:     Medical: Not on file     Non-medical: Not on file   Tobacco Use    Smoking status: Former Smoker     Packs/day: 0.25     Years: 15.00     Pack years: 3.75     Last attempt to quit: 1996     Years since quittin.7    Smokeless tobacco: Never Used   Substance and Sexual Activity    Alcohol use: Yes     Comment: social    Drug use: No    Sexual activity: Not on file   Lifestyle    Physical activity:     Days per week: Not on file     Minutes per session: Not on file    Stress: Not on file   Relationships    Social connections:     Talks on phone: Not on file     Gets together: Not on file     Attends Yarsanism service: Not on file     Active member of club or organization: Not on file     Attends meetings of clubs or organizations: Not on file     Relationship status: Not on file    Intimate partner violence:     Fear of current or ex partner: Not on file     Emotionally abused: Not on file     Physically abused: Not on file     Forced sexual activity: Not on file   Other Topics Concern    Not on file   Social History Narrative    Not on file         ALLERGIES: Patient has no known allergies. Review of Systems   Constitutional: Negative for chills and fever. HENT: Negative for facial swelling and mouth sores. Eyes: Negative for discharge and redness. Gastrointestinal: Negative for nausea and vomiting. Musculoskeletal: Positive for gait problem and joint swelling. Skin: Negative for color change and rash. Neurological: Negative for tremors and weakness. Psychiatric/Behavioral: Negative for confusion and decreased concentration. Vitals:    03/10/20 1159   BP: 141/72   Pulse: 74   Resp: 18   Temp: 97.8 °F (36.6 °C)   SpO2: 96%            Physical Exam  Vitals signs and nursing note reviewed. Constitutional:       Appearance: Normal appearance. HENT:      Head: Normocephalic and atraumatic. Nose: Nose normal.      Mouth/Throat:      Mouth: Mucous membranes are moist.   Eyes:      Extraocular Movements: Extraocular movements intact. Pupils: Pupils are equal, round, and reactive to light. Neck:      Musculoskeletal: Normal range of motion. Cardiovascular:      Rate and Rhythm: Regular rhythm. Heart sounds: Normal heart sounds. Pulmonary:      Effort: Pulmonary effort is normal.      Breath sounds: Normal breath sounds. Musculoskeletal: Normal range of motion. General: Tenderness present. No swelling. Legs:    Skin:     General: Skin is warm and dry.       Capillary Refill: Capillary refill takes less than 2 seconds. Findings: No erythema. Neurological:      General: No focal deficit present. Mental Status: She is alert and oriented to person, place, and time. Psychiatric:         Mood and Affect: Mood normal.         Behavior: Behavior normal.         Thought Content:  Thought content normal.         Judgment: Judgment normal.          MDM  Number of Diagnoses or Management Options  Arthritis of right knee:   Diagnosis management comments: oa - will  Provide lidocaine patches and voltaren gel, follow with ortho       Amount and/or Complexity of Data Reviewed  Tests in the radiology section of CPT®: reviewed    Risk of Complications, Morbidity, and/or Mortality  Presenting problems: minimal  Diagnostic procedures: minimal  Management options: minimal    Patient Progress  Patient progress: stable         Procedures

## 2020-03-10 NOTE — DISCHARGE INSTRUCTIONS
Patient Education        Artritis en Sia Juliana: Instrucciones de cuidado - [ Knee Arthritis: Care Instructions ]  Instrucciones de cuidado    La artritis en la rodilla es un desgaste del cartílago que amortigua la articulación de tong rodilla. Cuando el cartílago se desgasta, los huesos se rozan. [place] causa dolor y rigidez. La artritis en la rodilla tiende a empeorar con el tiempo. El tratamiento para la artritis en la rodilla incluye reducir el dolor, fortalecer los músculos de la pierna y mantener un peso corporal saludable. El tratamiento generalmente no mejora la condición del cartílago, shaka puede disminuir el dolor y mejorar el funcionamiento de la rodilla. Usted puede dilan medidas simples para proteger las articulaciones de tong [place] [address] dolor y ayudarle a mantenerse activo. La atención de seguimiento es zaida parte clave de tong tratamiento y seguridad. Asegúrese de hacer y acudir a todas las citas, y llame a tong médico si está teniendo problemas. También es zaida buena idea saber los resultados de mau exámenes y mantener zaida lista de los medicamentos que patti. ¿Cómo puede cuidarse en el hogar? · Tenga en cuenta que la artritis en la rodilla le provocará más dolor unos días que otros. · Mantenga un peso saludable. Baje de peso si tiene sobrepeso. Al pararse, la presión que ejerce cada corinne de peso sobre las rodillas se multiplica cuatro veces. Así es que si baja 10 libras (4.5 kg), reducirá 40 libras (18 kg) de presión en mau rodillas. · Hable con tong médico o fisioterapeuta acerca de los ejercicios que lo ayudarán a aliviar el dolor de la articulación. ? Antes de hacer ejercicio, alli estiramientos para ayudar a prevenir la rigidez y evitar lesiones. Es posible que disfrute las formas suaves de yoga para ayudar a mantener las articulaciones y los músculos de la rodilla flexibles. ? Camine en lugar de correr. ? Sunil en bicicleta.  [place] fortalece los músculos del muslo y [place] presión de tong rodilla. ? Use zapatos que le queden carlin y daisy cómodos. ? [place]a [place] en agua que le llegue hasta el pecho. [place] puede ayudarle a hacer ejercicio willard más tiempo con menos dolor. ? Evite ejercicios que incluyan ponerse en cuclillas o de rodillas. Estos pueden ejercer mucho esfuerzo en mau rodillas. ? Hable con tong médico para asegurarse de que el ejercicio que hace no esté empeorando la artritis. · No se siente por largos períodos de tiempo. Trate de caminar de vez en cuando para evitar que mau rodillas se pongan rígidas. · Pregúntele a tong médico o fisioterapeuta si las plantillas para zapatos pueden disminuir el dolor de la artritis. · Si tiene los [place], adquiera calzado deportivo al menos zaida vez al [place]. [place] puede ayudar a disminuir el esfuerzo en mau rodillas. · Utilice un dispositivo para ayudarlo a [employer]. ? Un bastón puede ayudarlo a mantener el equilibrio al [employer]. Sostenga el bastón con la mano opuesta a la rodilla adolorida. ? Si siente que se puede caer al caminar, trate de utilizar muletas o un andador con tuan frontales. Estos pueden prevenir caídas que pudieran provocar más daño a tong rodilla. ? Un soporte ortopédico para la rodilla puede ayudarlo a mantener tong rodilla estable y a evitar el dolor. ? También puede [employer] cosas para facilitarse la jeannette, arley un asiento más alto para el excusado y pasamanos en la bentley o en la ducha. · [employer] analgésicos (medicamentos para el dolor) exactamente según las indicaciones. ? No espere hasta que el dolor sea intenso. Obtendrá mejores resultados si los patti más temprano. ? Si no está tomando un analgésico recetado, tome amanda de venta jayro, ambar arley acetaminofén (Tylenol), ibuprofeno (Advil, Motrin) o naproxeno (Aleve). Naye y siga todas las instrucciones de la [place]. ? No tome dos o más analgésicos al [employer], a menos que el médico se lo haya indicado.  Muchos analgésicos contienen acetaminofén, es decir, Tylenol. El exceso de acetaminofén (Tylenol) puede ser dañino. ? Dígale a tong médico si está tomando un anticoagulante, si tiene diabetes o si tiene [place] a los [facility]. · Pregúntele a tong médico si pudiera beneficiarse de zaida inyección de esteroides en la rodilla. [place] podría aliviar el dolor willard varios meses. · Muchas personas marivel suplementos de glucosamina y condroitina para la osteoartritis. Algunas personas creen que son de [facility], shaka las investigaciones médicas no muestran que funcionen. Hable con tong médico antes de dilan estos suplementos. ¿Cuándo debe pedir ayuda? Llame a tong médico ahora mismo o busque atención médica inmediata si:    · Tiene hinchazón, calor o dolor repentinos en la rodilla.     · Tiene dolor en la rodilla y fiebre o salpullido.     · El dolor es tan jose que no puede usar la rodilla.    Preste especial atención a los cambios en tong tiffany y asegúrese de comunicarse con tong médico si tiene algún problema. ¿Dónde puede encontrar más información en inglés? Artur Vilma a http://anil-arnol.info/. [place] Anger [address] en la búsqueda para aprender más acerca de \"Artritis en la rodilla: Instrucciones de cuidado - [ Knee Arthritis: Care Instructions ]. \"  Revisado: 1 luis, 2019  Versión del contenido: 12.2  © 8911-6685 Healthwise, Incorporated. Las instrucciones de cuidado fueron adaptadas bajo licencia por [employer] (which disclaims liability or warranty for this information). Si usted tiene [place] afección médica o sobre estas instrucciones, siempre pregunte a tong profesional de tiffany. Healthwise, Incorporated niega toda garantía o responsabilidad por tong uso de esta información.        home with family    Use meds as directed  Follow with ortho tomorrow

## 2020-05-26 ENCOUNTER — HOSPITAL ENCOUNTER (EMERGENCY)
Age: 80
Discharge: STILL ADMITTED OR ADMINISTRATIVE | End: 2020-05-26
Attending: EMERGENCY MEDICINE

## 2020-06-18 ENCOUNTER — HOSPITAL ENCOUNTER (EMERGENCY)
Age: 80
Discharge: HOME | End: 2020-06-18
Attending: EMERGENCY MEDICINE

## 2020-06-18 ENCOUNTER — APPOINTMENT (OUTPATIENT)
Dept: CT IMAGING | Age: 80
End: 2020-06-18
Attending: EMERGENCY MEDICINE

## 2020-06-18 VITALS
WEIGHT: 136 LBS | SYSTOLIC BLOOD PRESSURE: 141 MMHG | BODY MASS INDEX: 23.22 KG/M2 | DIASTOLIC BLOOD PRESSURE: 65 MMHG | HEART RATE: 85 BPM | HEIGHT: 64 IN | TEMPERATURE: 98.1 F | RESPIRATION RATE: 16 BRPM | OXYGEN SATURATION: 95 %

## 2020-06-18 DIAGNOSIS — K29.90 GASTRITIS AND DUODENITIS: Primary | ICD-10-CM

## 2020-06-18 LAB
ALBUMIN SERPL-MCNC: 3.6 G/DL (ref 3.2–4.6)
ALBUMIN/GLOB SERPL: 0.9 {RATIO} (ref 1.2–3.5)
ALP SERPL-CCNC: 99 U/L (ref 50–136)
ALT SERPL-CCNC: 37 U/L (ref 12–65)
ANION GAP SERPL CALC-SCNC: 9 MMOL/L (ref 7–16)
AST SERPL-CCNC: 24 U/L (ref 15–37)
BASOPHILS # BLD: 0 K/UL (ref 0–0.2)
BASOPHILS NFR BLD: 1 % (ref 0–2)
BILIRUB SERPL-MCNC: 0.5 MG/DL (ref 0.2–1.1)
BUN SERPL-MCNC: 8 MG/DL (ref 8–23)
CALCIUM SERPL-MCNC: 8.6 MG/DL (ref 8.3–10.4)
CHLORIDE SERPL-SCNC: 105 MMOL/L (ref 98–107)
CO2 SERPL-SCNC: 24 MMOL/L (ref 21–32)
CREAT SERPL-MCNC: 0.78 MG/DL (ref 0.6–1)
DIFFERENTIAL METHOD BLD: NORMAL
EOSINOPHIL # BLD: 0.1 K/UL (ref 0–0.8)
EOSINOPHIL NFR BLD: 2 % (ref 0.5–7.8)
ERYTHROCYTE [DISTWIDTH] IN BLOOD BY AUTOMATED COUNT: 12 % (ref 11.9–14.6)
GLOBULIN SER CALC-MCNC: 3.8 G/DL (ref 2.3–3.5)
GLUCOSE SERPL-MCNC: 371 MG/DL (ref 65–100)
HCT VFR BLD AUTO: 41.2 % (ref 35.8–46.3)
HGB BLD-MCNC: 14 G/DL (ref 11.7–15.4)
IMM GRANULOCYTES # BLD AUTO: 0 K/UL (ref 0–0.5)
IMM GRANULOCYTES NFR BLD AUTO: 0 % (ref 0–5)
LIPASE SERPL-CCNC: 96 U/L (ref 73–393)
LYMPHOCYTES # BLD: 1.2 K/UL (ref 0.5–4.6)
LYMPHOCYTES NFR BLD: 26 % (ref 13–44)
MCH RBC QN AUTO: 31.7 PG (ref 26.1–32.9)
MCHC RBC AUTO-ENTMCNC: 34 G/DL (ref 31.4–35)
MCV RBC AUTO: 93.2 FL (ref 79.6–97.8)
MONOCYTES # BLD: 0.5 K/UL (ref 0.1–1.3)
MONOCYTES NFR BLD: 10 % (ref 4–12)
NEUTS SEG # BLD: 2.8 K/UL (ref 1.7–8.2)
NEUTS SEG NFR BLD: 60 % (ref 43–78)
NRBC # BLD: 0 K/UL (ref 0–0.2)
PLATELET # BLD AUTO: 239 K/UL (ref 150–450)
PMV BLD AUTO: 11.8 FL (ref 9.4–12.3)
POTASSIUM SERPL-SCNC: 4.3 MMOL/L (ref 3.5–5.1)
PROT SERPL-MCNC: 7.4 G/DL (ref 6.3–8.2)
RBC # BLD AUTO: 4.42 M/UL (ref 4.05–5.2)
SODIUM SERPL-SCNC: 138 MMOL/L (ref 136–145)
WBC # BLD AUTO: 4.6 K/UL (ref 4.3–11.1)

## 2020-06-18 PROCEDURE — 85025 COMPLETE CBC W/AUTO DIFF WBC: CPT

## 2020-06-18 PROCEDURE — 80053 COMPREHEN METABOLIC PANEL: CPT

## 2020-06-18 PROCEDURE — 99284 EMERGENCY DEPT VISIT MOD MDM: CPT

## 2020-06-18 PROCEDURE — 74177 CT ABD & PELVIS W/CONTRAST: CPT

## 2020-06-18 PROCEDURE — 74011000250 HC RX REV CODE- 250: Performed by: EMERGENCY MEDICINE

## 2020-06-18 PROCEDURE — 83690 ASSAY OF LIPASE: CPT

## 2020-06-18 PROCEDURE — 74011250637 HC RX REV CODE- 250/637: Performed by: EMERGENCY MEDICINE

## 2020-06-18 PROCEDURE — 74011636320 HC RX REV CODE- 636/320: Performed by: EMERGENCY MEDICINE

## 2020-06-18 RX ORDER — PANTOPRAZOLE SODIUM 40 MG/1
40 TABLET, DELAYED RELEASE ORAL DAILY
Qty: 20 TAB | Refills: 0 | Status: SHIPPED | OUTPATIENT
Start: 2020-06-18 | End: 2020-07-08

## 2020-06-18 RX ORDER — LIDOCAINE HYDROCHLORIDE 20 MG/ML
15 SOLUTION OROPHARYNGEAL
Status: COMPLETED | OUTPATIENT
Start: 2020-06-18 | End: 2020-06-18

## 2020-06-18 RX ORDER — SODIUM CHLORIDE 0.9 % (FLUSH) 0.9 %
10 SYRINGE (ML) INJECTION
Status: COMPLETED | OUTPATIENT
Start: 2020-06-18 | End: 2020-06-18

## 2020-06-18 RX ORDER — MAG HYDROX/ALUMINUM HYD/SIMETH 200-200-20
30 SUSPENSION, ORAL (FINAL DOSE FORM) ORAL
Status: COMPLETED | OUTPATIENT
Start: 2020-06-18 | End: 2020-06-18

## 2020-06-18 RX ADMIN — DIATRIZOATE MEGLUMINE AND DIATRIZOATE SODIUM 15 ML: 660; 100 LIQUID ORAL; RECTAL at 11:17

## 2020-06-18 RX ADMIN — Medication 10 ML: at 14:04

## 2020-06-18 RX ADMIN — LIDOCAINE HYDROCHLORIDE 15 ML: 20 SOLUTION ORAL; TOPICAL at 10:22

## 2020-06-18 RX ADMIN — ALUMINUM HYDROXIDE, MAGNESIUM HYDROXIDE, AND SIMETHICONE 30 ML: 200; 200; 20 SUSPENSION ORAL at 10:22

## 2020-06-18 RX ADMIN — IOPAMIDOL 100 ML: 755 INJECTION, SOLUTION INTRAVENOUS at 14:03

## 2020-06-18 NOTE — ED NOTES
I have reviewed discharge instructions with the patient and daughter  The patient and daughter verbalized understanding. Patient left ED via Discharge Method: ambulatory to Home with family via [address] for questions and clarification provided. Patient given 1 scripts. To continue your aftercare when you leave the hospital, you may receive an automated call from our care team to check in on how you are doing. This is a free service and part of our promise to provide the best care and service to meet your aftercare needs.  If you have questions, or wish to unsubscribe from this service please call [phone]. Thank you for Choosing our [facility] Emergency Department.

## 2020-06-18 NOTE — DISCHARGE INSTRUCTIONS
Patient Education        Gastritis: Instrucciones de cuidado  Gastritis: Care Instructions  Instrucciones de cuidado     La gastritis es dolor y malestar estomacal. Sucede cuando algo irrita el revestimiento del JEWEL. Hay muchas cosas que pueden causarla. Entre estas se incluyen zaida infección arley la gripe o algo que ha comido o bebido. Los medicamentos o zaida llaga en el recubrimiento del [place]) también pueden causarla. Puede tener abotagamiento y dolor abdominal. Podría eructar, vomitar y tener revoltura estomacal.  Usted debería poder aliviar el problema tomando medicamentos. Y ambar vez sería útil cambiar la alimentación. Si la gastritis continúa, tong médico podría recetarle medicamentos. La atención de seguimiento es zaida parte clave de tong tratamiento y seguridad. Asegúrese de hacer y acudir a todas las citas, y llame a tong médico si está teniendo problemas. También es zaida buena idea saber los resultados de mau exámenes y mantener zaida lista de los medicamentos que patti. ¿Cómo puede cuidarse en el hogar? · Si tong médico le recetó antibióticos, tómelos según las indicaciones. No deje de tomarlos por el hecho de sentirse mejor. Debe dilan todos los antibióticos hasta terminarlos. · Sea vance con los medicamentos. Si tong médico le recetó un medicamento para reducir el ácido estomacal, tómelo según las indicaciones. Llame a tong médico si pranay estar teniendo problemas con tong medicamento. · No tome ningún otro medicamento, incluyendo analgésicos (medicamentos para el dolor) de venta jayro, sin consultar con tong médico adelaida. · Si tong médico le recomienda que tome medicamentos de venta jayro para reducir el ácido estomacal, arley Pepcid AC (famotidina), Prilosec (omeprazol) o Tagamet HB (cimetidina), siga las instrucciones de la [place]. · [place] mucho líquido (lo suficiente arley para que tong orina sea de color amarillo shital o transparente arley el agua) para prevenir la deshidratación.  Opte por dilan Dimple Medel y otros líquidos ursula sin cafeína. Si tiene zaida enfermedad renal, cardíaca o hepática y tiene que Cici's líquidos, hable con tong médico antes de aumentar tong consumo. · Limite la cantidad de alcohol que sravan. · Evite el café, el té, las bebidas de cola, el chocolate y otros alimentos que contengan cafeína. Aumentan el ácido estomacal.  ¿Cuándo debe pedir ayuda? Llame [id number] en cualquier momento que considere que necesita atención de [place]. Por ejemplo, llame si:  · Vomita luis f o algo parecido a granos de café molido. · Neinta heces son de color rojizo o muy sanguinolentas (con luis f). Llame a tong médico ahora mismo o busque atención médica inmediata si:  · Empieza a respirar en forma acelerada y no ha producido [place] en las últimas 8 horas. · No puede retener líquidos en el estómago. Preste especial atención a los cambios en tong tiffany y asegúrese de comunicarse con tong médico si:  · No mejora arley se esperaba. ¿Dónde puede encontrar más información en inglés? Vaya a http://anil-arnol.info/  Patt [id number] en la búsqueda para aprender más acerca de \"Gastritis: Instrucciones de cuidado. \"  Revisado: 12 tricia, 3438               EQKIRHP del contenido: 12.5  © 3173-4084 [employer]. Las instrucciones de cuidado fueron adaptadas bajo licencia por [employer] (which disclaims liability or warranty for this information). Si usted tiene [place] afección médica o sobre estas instrucciones, siempre pregunte a tong profesional de tiffany. Healthwise, Incorporated niega toda garantía o responsabilidad por tong uso de esta información.

## 2020-06-18 NOTE — ED TRIAGE NOTES
Pt arrives via pov with daughter. Both with masks on. Pt presents with complaints of abdominal pain for over one week., Pt denies n/v/d. Pt states pain feels like stabbing. Pt states pain is constant but worse at night. Pt states last BM was yesterday and was normal. Pt denies urinary complaints.

## 2020-06-18 NOTE — PROGRESS NOTES
Hospital  provided over-the-phone language services for CT procedure. Please call [phone] (DT) if further assistance is needed.

## 2020-06-18 NOTE — ED PROVIDER NOTES
Patient with hypertension and diabetes. Previous cholecystectomy. 1 month ago started with epigastric and suprapubic pain. No nausea or vomiting. Slight diarrhea but no constipation. No dysuria hematuria. Nothing makes the pain worse or better. The history is provided by the patient. A  was used. Abdominal Pain    This is a new problem. The current episode started more than 1 week ago. The problem occurs constantly. The problem has not changed since onset. The pain is associated with an unknown factor. The pain is located in the epigastric region and suprapubic region. The quality of the pain is aching. The pain is mild. Associated symptoms include dysuria. Pertinent negatives include no fever, no diarrhea, no melena, no nausea, no vomiting, no constipation, no hematuria, no headaches, no chest pain and no back pain. Nothing worsens the pain. The pain is relieved by nothing. Her past medical history is significant for DM. The patient's surgical history includes cholecystectomy.        Past Medical History:   Diagnosis Date    Anxiety     Depression     Diabetes mellitus type 2, uncontrolled (HCC)     History of bilateral cataract extraction     History of coronary artery disease     History of gallbladder disease     Hypercholesterolemia     Hyperlipidemia     Hypertension     Osteopenia     Wears dentures        Past Surgical History:   Procedure Laterality Date    HX CATARACT REMOVAL Bilateral     HX CHOLECYSTECTOMY           Family History:   Problem Relation Age of Onset    No Known Problems Mother     No Known Problems Father        Social History     Socioeconomic History    Marital status: SINGLE     Spouse name: Not on file    Number of children: Not on file    Years of education: Not on file    Highest education level: Not on file   Occupational History    Not on file   Social Needs    Financial resource strain: Not on file    Food insecurity     Worry: Not on file     Inability: Not on file    Transportation needs     Medical: Not on file     Non-medical: Not on file   Tobacco Use    Smoking status: Former Smoker     Packs/day: 0.25     Years: 15.00     Pack years: 3.75     Last attempt to quit: 1996     Years since quittin.9    Smokeless tobacco: Never Used   Substance and Sexual Activity    Alcohol use: Yes     Comment: social    Drug use: No    Sexual activity: Not on file   Lifestyle    Physical activity     Days per week: Not on file     Minutes per session: Not on file    Stress: Not on file   Relationships    Social connections     Talks on phone: Not on file     Gets together: Not on file     Attends Alevism service: Not on file     Active member of club or organization: Not on file     Attends meetings of clubs or organizations: Not on file     Relationship status: Not on file    Intimate partner violence     Fear of current or ex partner: Not on file     Emotionally abused: Not on file     Physically abused: Not on file     Forced sexual activity: Not on file   Other Topics Concern    Not on file   Social History Narrative    Not on file         ALLERGIES: Patient has no known allergies. Review of Systems   Constitutional: Negative for chills and fever. HENT: Negative for rhinorrhea and sore throat. Eyes: Negative for pain and redness. Respiratory: Negative for chest tightness, shortness of breath and wheezing. Cardiovascular: Negative for chest pain and leg swelling. Gastrointestinal: Positive for abdominal pain. Negative for constipation, diarrhea, melena, nausea and vomiting. Genitourinary: Positive for dysuria. Negative for hematuria, vaginal bleeding and vaginal discharge. Musculoskeletal: Negative for back pain, gait problem, neck pain and neck stiffness. Skin: Negative for color change and rash. Neurological: Negative for weakness, numbness and headaches.        Vitals:    20 0948   BP: 151/77 Pulse: 85   Resp: 16   Temp: 98.1 °F (36.7 °C)   SpO2: 97%   Weight: 61.7 kg (136 lb)   Height: 5' 4\" (1.626 m)            Physical Exam  Constitutional:       Appearance: She is well-developed. HENT:      Head: Normocephalic and atraumatic. Neck:      Musculoskeletal: Normal range of motion and neck supple. Cardiovascular:      Rate and Rhythm: Normal rate and regular rhythm. Pulmonary:      Effort: Pulmonary effort is normal.      Breath sounds: Normal breath sounds. Abdominal:      General: Bowel sounds are normal.      Palpations: Abdomen is soft. Tenderness: There is abdominal tenderness (mild epigastric). Musculoskeletal: Normal range of motion. Skin:     General: Skin is warm and dry. Neurological:      Mental Status: She is alert and oriented to person, place, and time. MDM  Number of Diagnoses or Management Options  Diagnosis management comments: Ct no acute. Will discharge with Gi follow up. Amount and/or Complexity of Data Reviewed  Clinical lab tests: ordered and reviewed  Tests in the medicine section of CPT®: ordered and reviewed    Patient Progress  Patient progress: stable         Procedures        UA neg. Results Include:    Recent Results (from the past 24 hour(s))   CBC WITH AUTOMATED DIFF    Collection Time: 06/18/20  9:51 AM   Result Value Ref Range    WBC 4.6 4.3 - 11.1 K/uL    RBC 4.42 4.05 - 5.2 M/uL    HGB 14.0 11.7 - 15.4 g/dL    HCT 41.2 35.8 - 46.3 %    MCV 93.2 79.6 - 97.8 FL    MCH 31.7 26.1 - 32.9 PG    MCHC 34.0 31.4 - 35.0 g/dL    RDW 12.0 11.9 - 14.6 %    PLATELET 334 021 - 462 K/uL    MPV 11.8 9.4 - 12.3 FL    ABSOLUTE NRBC 0.00 0.0 - 0.2 K/uL    DF AUTOMATED      NEUTROPHILS 60 43 - 78 %    LYMPHOCYTES 26 13 - 44 %    MONOCYTES 10 4.0 - 12.0 %    EOSINOPHILS 2 0.5 - 7.8 %    BASOPHILS 1 0.0 - 2.0 %    IMMATURE GRANULOCYTES 0 0.0 - 5.0 %    ABS. NEUTROPHILS 2.8 1.7 - 8.2 K/UL    ABS. LYMPHOCYTES 1.2 0.5 - 4.6 K/UL    ABS.  MONOCYTES 0.5 0.1 - 1.3 K/UL    ABS. EOSINOPHILS 0.1 0.0 - 0.8 K/UL    ABS. BASOPHILS 0.0 0.0 - 0.2 K/UL    ABS. IMM. GRANS. 0.0 0.0 - 0.5 K/UL   METABOLIC PANEL, COMPREHENSIVE    Collection Time: 06/18/20  9:51 AM   Result Value Ref Range    Sodium 138 136 - 145 mmol/L    Potassium 4.3 3.5 - 5.1 mmol/L    Chloride 105 98 - 107 mmol/L    CO2 24 21 - 32 mmol/L    Anion gap 9 7 - 16 mmol/L    Glucose 371 (H) 65 - 100 mg/dL    BUN 8 8 - 23 MG/DL    Creatinine 0.78 0.6 - 1.0 MG/DL    GFR est AA >60 >60 ml/min/1.73m2    GFR est non-AA >60 >60 ml/min/1.73m2    Calcium 8.6 8.3 - 10.4 MG/DL    Bilirubin, total 0.5 0.2 - 1.1 MG/DL    ALT (SGPT) 37 12 - 65 U/L    AST (SGOT) 24 15 - 37 U/L    Alk. phosphatase 99 50 - 136 U/L    Protein, total 7.4 6.3 - 8.2 g/dL    Albumin 3.6 3.2 - 4.6 g/dL    Globulin 3.8 (H) 2.3 - 3.5 g/dL    A-G Ratio 0.9 (L) 1.2 - 3.5     LIPASE    Collection Time: 06/18/20  9:51 AM   Result Value Ref Range    Lipase 96 73 - 393 U/L                CT ABD PELV W CONT (Final result)   Result time 06/18/20 14:29:27   Final result by Brandi Powers MD (06/18/20 14:29:27)                Impression:    IMPRESSION:   No acute pathology identified.  Other chronic and incidental findings as above. Narrative:    CT ABDOMEN AND PELVIS WITH CONTRAST    HISTORY: Abd pain, 78 years Female Pt arrives via pov with daughter. Both with  masks on. Pt presents with complaints of abdominal pain for over one week., Pt  denies n/v/d. Pt states pain feels like stabbing. Pt states pain is constant but  worse at night. Pt states last BM was yesterday and was normal. Pt denies  urinary complaints    COMPARISON: CT abdomen pelvis June 16, 2013    TECHNIQUE: Oral contrast was administered.  100 cc of nonionic intravenous  contrast was injected, and axial helical CT images were obtained from above the  diaphragm through the pelvis.  Coronal reformatted images were obtained at the  scanner console and made available for review.  All CT scans at this location  are performed using dose modulation techniques as appropriate to a performed  exam including the following: automated exposure control; adjustment of the mA  and/or kV according to patient's size (this includes techniques or standardized  protocols for targeted exams where dose is matched to indication/reason for  exam; i.e. extremities or head); use of iterative reconstruction technique. FINDINGS:    ABDOMEN:  Minimal dependent subsegmental atelectasis bilateral lung bases.  Evidence of  cholecystectomy.  Subcentimeter hypoenhancing lesion in the inferior tip of the  right hepatic lobe is too small to characterize.  Normal-appearing pancreas,  bilateral adrenal glands and kidneys.  Persistent lobulated appearance of the  spleen likely secondary to prior trauma or infarct.  Mild calcific  atherosclerosis of a normal caliber abdominal aorta.  No evidence of significant  lymphadenopathy.  Normal-appearing small bowel.  No evidence of intraperitoneal  free air or free fluid.  Image quality slightly degraded by respiratory motion  artifact.     PELVIS:  Normal-appearing urinary bladder.  Normal-appearing atrophic uterus and  bilateral adnexa.  Normal-appearing colon and appendix.  No evidence of pelvic  free fluid.  No evidence of significant inguinal or pelvic sidewall  lymphadenopathy.   Visualized osseous structures unremarkable.

## 2020-06-19 ENCOUNTER — PATIENT OUTREACH (OUTPATIENT)
Dept: CASE MANAGEMENT | Age: 80
End: 2020-06-19

## 2020-06-19 NOTE — PROGRESS NOTES
Patient contacted regarding recent discharge and COVID-19 risk. Discussed COVID-19 related testing which was not done at this time. Test results were not done. Care Transition Nurse/ Ambulatory Care Manager contacted the family by telephone to perform post discharge assessment. Verified name and  with family as identifiers. Patient has following risk factors of: diabetes and HTN. CTN/ACM reviewed discharge instructions, medical action plan and red flags related to discharge diagnosis. Reviewed and educated them on any new and changed medications related to discharge diagnosis. Advised obtaining a 90-day supply of all daily and as-needed medications. Education provided regarding infection prevention, and signs and symptoms of COVID-19 and when to seek medical attention with family who verbalized understanding. Discussed exposure protocols and quarantine from [address] you at higher risk for severe illness  and given an opportunity for questions and concerns. The family agrees to contact the COVID-19 hotline [phone] or PCP office for questions related to their healthcare. CTN/ACM provided contact information for future reference. From CDC: Are you at higher risk for severe illness?  Wash your hands often.  Avoid close contact (6 feet, which is about two arm lengths) with people who are sick.  Put distance between yourself and other people if COVID-19 is spreading in your community.  Clean and disinfect frequently touched surfaces.  Avoid all cruise travel and non-essential air travel.  Call your healthcare professional if you have concerns about COVID-19 and your underlying condition or if you are sick.     For more information on steps you can take to protect yourself, see CDC's How to Protect Yourself      Patient/family/caregiver given information for [facility] and agrees to enroll no, [place] speaker  Patient's preferred e-mail:  NA  Patient's preferred phone number: NA  Based on Loop alert triggers, patient will be contacted by nurse care manager for worsening symptoms. Plan for follow-up call in 7-14 days based on severity of symptoms and risk factors. Confirmed emergency contact/ healthcare decision maker as daughter, Lisa Oliver. Reviewed new RX for Protonix, encouraged GI and PCP f/u.

## 2020-07-07 ENCOUNTER — PATIENT OUTREACH (OUTPATIENT)
Dept: CASE MANAGEMENT | Age: 80
End: 2020-07-07

## 2020-07-08 NOTE — PROGRESS NOTES
RNCM call to pt/ family for final ED DRAKE, unable to leave a message, no return call. No further outreach planned.

## 2022-04-02 ENCOUNTER — HOSPITAL ENCOUNTER (OUTPATIENT)
Dept: LAB | Age: 82
Discharge: HOME | End: 2022-04-02

## 2022-04-02 LAB
ANION GAP SERPL CALC-SCNC: 8 MMOL/L (ref 7–16)
BASOPHILS # BLD: 0 K/UL (ref 0–0.2)
BASOPHILS NFR BLD: 0 % (ref 0–2)
BUN SERPL-MCNC: 19 MG/DL (ref 8–23)
CALCIUM SERPL-MCNC: 8.8 MG/DL (ref 8.3–10.4)
CHLORIDE SERPL-SCNC: 102 MMOL/L (ref 98–107)
CO2 SERPL-SCNC: 27 MMOL/L (ref 21–32)
CREAT SERPL-MCNC: 0.5 MG/DL (ref 0.6–1)
DIFFERENTIAL METHOD BLD: ABNORMAL
EOSINOPHIL # BLD: 0 K/UL (ref 0–0.8)
EOSINOPHIL NFR BLD: 0 % (ref 0.5–7.8)
ERYTHROCYTE [DISTWIDTH] IN BLOOD BY AUTOMATED COUNT: 13 % (ref 11.9–14.6)
GLUCOSE SERPL-MCNC: 145 MG/DL (ref 65–100)
HCT VFR BLD AUTO: 34 % (ref 35.8–46.3)
HGB BLD-MCNC: 11.3 G/DL (ref 11.7–15.4)
IMM GRANULOCYTES # BLD AUTO: 0 K/UL (ref 0–0.5)
IMM GRANULOCYTES NFR BLD AUTO: 0 % (ref 0–5)
LYMPHOCYTES # BLD: 1 K/UL (ref 0.5–4.6)
LYMPHOCYTES NFR BLD: 22 % (ref 13–44)
MCH RBC QN AUTO: 31.7 PG (ref 26.1–32.9)
MCHC RBC AUTO-ENTMCNC: 33.2 G/DL (ref 31.4–35)
MCV RBC AUTO: 95.2 FL (ref 79.6–97.8)
MONOCYTES # BLD: 0.6 K/UL (ref 0.1–1.3)
MONOCYTES NFR BLD: 12 % (ref 4–12)
NEUTS SEG # BLD: 3.1 K/UL (ref 1.7–8.2)
NEUTS SEG NFR BLD: 66 % (ref 43–78)
NRBC # BLD: 0 K/UL (ref 0–0.2)
PLATELET # BLD AUTO: 265 K/UL (ref 150–450)
PMV BLD AUTO: 11 FL (ref 9.4–12.3)
POTASSIUM SERPL-SCNC: 4.4 MMOL/L (ref 3.5–5.1)
RBC # BLD AUTO: 3.57 M/UL (ref 4.05–5.2)
SODIUM SERPL-SCNC: 137 MMOL/L (ref 136–145)
WBC # BLD AUTO: 4.7 K/UL (ref 4.3–11.1)

## 2022-04-02 PROCEDURE — 85025 COMPLETE CBC W/AUTO DIFF WBC: CPT

## 2022-04-02 PROCEDURE — 80048 BASIC METABOLIC PNL TOTAL CA: CPT
